# Patient Record
Sex: FEMALE | Race: OTHER | NOT HISPANIC OR LATINO
[De-identification: names, ages, dates, MRNs, and addresses within clinical notes are randomized per-mention and may not be internally consistent; named-entity substitution may affect disease eponyms.]

---

## 2020-03-05 ENCOUNTER — APPOINTMENT (OUTPATIENT)
Dept: FAMILY MEDICINE | Facility: CLINIC | Age: 30
End: 2020-03-05
Payer: COMMERCIAL

## 2020-03-05 VITALS
BODY MASS INDEX: 31.39 KG/M2 | SYSTOLIC BLOOD PRESSURE: 110 MMHG | OXYGEN SATURATION: 99 % | DIASTOLIC BLOOD PRESSURE: 80 MMHG | HEART RATE: 79 BPM | RESPIRATION RATE: 16 BRPM | WEIGHT: 200 LBS | HEIGHT: 67 IN

## 2020-03-05 DIAGNOSIS — Z87.09 PERSONAL HISTORY OF OTHER DISEASES OF THE RESPIRATORY SYSTEM: ICD-10-CM

## 2020-03-05 PROCEDURE — 96127 BRIEF EMOTIONAL/BEHAV ASSMT: CPT

## 2020-03-05 PROCEDURE — 99385 PREV VISIT NEW AGE 18-39: CPT | Mod: 25

## 2020-03-05 NOTE — PHYSICAL EXAM
[No Acute Distress] : no acute distress [Well-Appearing] : well-appearing [Normal Sclera/Conjunctiva] : normal sclera/conjunctiva [PERRL] : pupils equal round and reactive to light [Normal Outer Ear/Nose] : the outer ears and nose were normal in appearance [Normal Oropharynx] : the oropharynx was normal [No Lymphadenopathy] : no lymphadenopathy [Supple] : supple [No Respiratory Distress] : no respiratory distress  [No Accessory Muscle Use] : no accessory muscle use [Clear to Auscultation] : lungs were clear to auscultation bilaterally [Normal Rate] : normal rate  [Regular Rhythm] : with a regular rhythm [Normal S1, S2] : normal S1 and S2 [Soft] : abdomen soft [Non Tender] : non-tender [Non-distended] : non-distended [Normal Bowel Sounds] : normal bowel sounds [No Rash] : no rash [No Focal Deficits] : no focal deficits [Normal Affect] : the affect was normal [Normal Insight/Judgement] : insight and judgment were intact [de-identified] : + tonsillar exudate

## 2020-03-05 NOTE — REVIEW OF SYSTEMS
[Sore Throat] : sore throat [Fever] : no fever [Chills] : no chills [Fatigue] : no fatigue [Discharge] : no discharge [Vision Problems] : no vision problems [Earache] : no earache [Nasal Discharge] : no nasal discharge [Chest Pain] : no chest pain [Palpitations] : no palpitations [Shortness Of Breath] : no shortness of breath [Wheezing] : no wheezing [Cough] : no cough [Abdominal Pain] : no abdominal pain [Nausea] : no nausea [Diarrhea] : diarrhea [Vomiting] : no vomiting [Headache] : no headache [Dizziness] : no dizziness [Anxiety] : no anxiety [Depression] : no depression

## 2020-03-05 NOTE — PLAN
[FreeTextEntry1] : Obesity\par - healthy living referral to talk to nutritionist\par \par Constipation\par - referral to nutritionist to discuss high fiber diet\par - trial of IBguard  and probiotic\par - pt taking miralax and magnesium \par \par Sore throat\par - rapid strept neg\par - f/u throat cx\par - advised likely viral and to start salt water gargles, tea with honey\par \par Labs reviewed. HLD is mild and can be managed with diet and exercise \par \par f/u in 1 yr or PRN

## 2020-03-05 NOTE — HISTORY OF PRESENT ILLNESS
[FreeTextEntry1] : establish care, blood work review  [de-identified] : 29 year old female presents to establish care. She is currently here brien work visa from [place] and had labs done with company. Found to have slightly elevated cholesterol. She states she has gained weight since living in [place] for past 3 years. She has recently moved to [place]. Has just joined a gym. She complains of constipation and would like help with a high fiber diet \par She also complains of sore throat. No fever or cough.

## 2020-03-10 LAB — BACTERIA THROAT CULT: NORMAL

## 2020-03-30 ENCOUNTER — APPOINTMENT (OUTPATIENT)
Dept: CHRONIC DISEASE MANAGEMENT | Facility: CLINIC | Age: 30
End: 2020-03-30

## 2020-03-31 ENCOUNTER — TRANSCRIPTION ENCOUNTER (OUTPATIENT)
Age: 30
End: 2020-03-31

## 2020-05-04 ENCOUNTER — APPOINTMENT (OUTPATIENT)
Dept: CHRONIC DISEASE MANAGEMENT | Facility: CLINIC | Age: 30
End: 2020-05-04

## 2020-12-23 PROBLEM — Z87.09 HISTORY OF SORE THROAT: Status: RESOLVED | Noted: 2020-03-05 | Resolved: 2020-12-23

## 2021-02-19 ENCOUNTER — INPATIENT (INPATIENT)
Facility: HOSPITAL | Age: 31
LOS: 1 days | Discharge: HOME | DRG: 346 | End: 2021-02-21
Attending: SPECIALIST | Admitting: SPECIALIST
Payer: COMMERCIAL

## 2021-02-19 VITALS — WEIGHT: 182.98 LBS

## 2021-02-19 DIAGNOSIS — Z98.890 OTHER SPECIFIED POSTPROCEDURAL STATES: Chronic | ICD-10-CM

## 2021-02-19 DIAGNOSIS — L02.91 CUTANEOUS ABSCESS, UNSPECIFIED: ICD-10-CM

## 2021-02-19 DIAGNOSIS — K60.3 ANAL FISTULA: Chronic | ICD-10-CM

## 2021-02-19 LAB
ABO RH CONFIRMATION: SIGNIFICANT CHANGE UP
ALBUMIN SERPL ELPH-MCNC: 3.8 G/DL — SIGNIFICANT CHANGE UP (ref 3.3–5)
ALP SERPL-CCNC: 72 U/L — SIGNIFICANT CHANGE UP (ref 40–120)
ALT FLD-CCNC: 29 U/L — SIGNIFICANT CHANGE UP (ref 12–78)
ANION GAP SERPL CALC-SCNC: 9 MMOL/L — SIGNIFICANT CHANGE UP (ref 5–17)
APPEARANCE UR: CLEAR — SIGNIFICANT CHANGE UP
APTT BLD: 38.2 SEC — HIGH (ref 27.5–35.5)
AST SERPL-CCNC: 17 U/L — SIGNIFICANT CHANGE UP (ref 15–37)
BACTERIA # UR AUTO: ABNORMAL
BASOPHILS # BLD AUTO: 0.03 K/UL — SIGNIFICANT CHANGE UP (ref 0–0.2)
BASOPHILS NFR BLD AUTO: 0.2 % — SIGNIFICANT CHANGE UP (ref 0–2)
BILIRUB SERPL-MCNC: 0.3 MG/DL — SIGNIFICANT CHANGE UP (ref 0.2–1.2)
BILIRUB UR-MCNC: NEGATIVE — SIGNIFICANT CHANGE UP
BLD GP AB SCN SERPL QL: SIGNIFICANT CHANGE UP
BUN SERPL-MCNC: 6 MG/DL — LOW (ref 7–23)
CALCIUM SERPL-MCNC: 8.9 MG/DL — SIGNIFICANT CHANGE UP (ref 8.5–10.1)
CHLORIDE SERPL-SCNC: 106 MMOL/L — SIGNIFICANT CHANGE UP (ref 96–108)
CO2 SERPL-SCNC: 25 MMOL/L — SIGNIFICANT CHANGE UP (ref 22–31)
COLOR SPEC: YELLOW — SIGNIFICANT CHANGE UP
COMMENT - URINE: SIGNIFICANT CHANGE UP
CREAT SERPL-MCNC: 0.69 MG/DL — SIGNIFICANT CHANGE UP (ref 0.5–1.3)
DIFF PNL FLD: ABNORMAL
EOSINOPHIL # BLD AUTO: 0.02 K/UL — SIGNIFICANT CHANGE UP (ref 0–0.5)
EOSINOPHIL NFR BLD AUTO: 0.1 % — SIGNIFICANT CHANGE UP (ref 0–6)
EPI CELLS # UR: ABNORMAL
GLUCOSE SERPL-MCNC: 88 MG/DL — SIGNIFICANT CHANGE UP (ref 70–99)
GLUCOSE UR QL: NEGATIVE MG/DL — SIGNIFICANT CHANGE UP
HCT VFR BLD CALC: 38.1 % — SIGNIFICANT CHANGE UP (ref 34.5–45)
HGB BLD-MCNC: 12.7 G/DL — SIGNIFICANT CHANGE UP (ref 11.5–15.5)
IMM GRANULOCYTES NFR BLD AUTO: 0.3 % — SIGNIFICANT CHANGE UP (ref 0–1.5)
INR BLD: 1.16 RATIO — SIGNIFICANT CHANGE UP (ref 0.88–1.16)
KETONES UR-MCNC: NEGATIVE — SIGNIFICANT CHANGE UP
LACTATE SERPL-SCNC: 1.2 MMOL/L — SIGNIFICANT CHANGE UP (ref 0.7–2)
LEUKOCYTE ESTERASE UR-ACNC: ABNORMAL
LYMPHOCYTES # BLD AUTO: 1.97 K/UL — SIGNIFICANT CHANGE UP (ref 1–3.3)
LYMPHOCYTES # BLD AUTO: 14.2 % — SIGNIFICANT CHANGE UP (ref 13–44)
MCHC RBC-ENTMCNC: 28.4 PG — SIGNIFICANT CHANGE UP (ref 27–34)
MCHC RBC-ENTMCNC: 33.3 GM/DL — SIGNIFICANT CHANGE UP (ref 32–36)
MCV RBC AUTO: 85.2 FL — SIGNIFICANT CHANGE UP (ref 80–100)
MONOCYTES # BLD AUTO: 0.77 K/UL — SIGNIFICANT CHANGE UP (ref 0–0.9)
MONOCYTES NFR BLD AUTO: 5.5 % — SIGNIFICANT CHANGE UP (ref 2–14)
NEUTROPHILS # BLD AUTO: 11.09 K/UL — HIGH (ref 1.8–7.4)
NEUTROPHILS NFR BLD AUTO: 79.7 % — HIGH (ref 43–77)
NITRITE UR-MCNC: NEGATIVE — SIGNIFICANT CHANGE UP
PH UR: 6 — SIGNIFICANT CHANGE UP (ref 5–8)
PLATELET # BLD AUTO: 250 K/UL — SIGNIFICANT CHANGE UP (ref 150–400)
POTASSIUM SERPL-MCNC: 3.7 MMOL/L — SIGNIFICANT CHANGE UP (ref 3.5–5.3)
POTASSIUM SERPL-SCNC: 3.7 MMOL/L — SIGNIFICANT CHANGE UP (ref 3.5–5.3)
PROT SERPL-MCNC: 8.3 GM/DL — SIGNIFICANT CHANGE UP (ref 6–8.3)
PROT UR-MCNC: NEGATIVE MG/DL — SIGNIFICANT CHANGE UP
PROTHROM AB SERPL-ACNC: 13.5 SEC — SIGNIFICANT CHANGE UP (ref 10.6–13.6)
RBC # BLD: 4.47 M/UL — SIGNIFICANT CHANGE UP (ref 3.8–5.2)
RBC # FLD: 12.2 % — SIGNIFICANT CHANGE UP (ref 10.3–14.5)
RBC CASTS # UR COMP ASSIST: ABNORMAL /HPF (ref 0–4)
SARS-COV-2 RNA SPEC QL NAA+PROBE: SIGNIFICANT CHANGE UP
SODIUM SERPL-SCNC: 140 MMOL/L — SIGNIFICANT CHANGE UP (ref 135–145)
SP GR SPEC: 1 — LOW (ref 1.01–1.02)
UROBILINOGEN FLD QL: NEGATIVE MG/DL — SIGNIFICANT CHANGE UP
WBC # BLD: 13.92 K/UL — HIGH (ref 3.8–10.5)
WBC # FLD AUTO: 13.92 K/UL — HIGH (ref 3.8–10.5)
WBC UR QL: >50

## 2021-02-19 PROCEDURE — 83735 ASSAY OF MAGNESIUM: CPT

## 2021-02-19 PROCEDURE — 87075 CULTR BACTERIA EXCEPT BLOOD: CPT

## 2021-02-19 PROCEDURE — 87206 SMEAR FLUORESCENT/ACID STAI: CPT

## 2021-02-19 PROCEDURE — 85027 COMPLETE CBC AUTOMATED: CPT

## 2021-02-19 PROCEDURE — 87116 MYCOBACTERIA CULTURE: CPT

## 2021-02-19 PROCEDURE — C9290: CPT

## 2021-02-19 PROCEDURE — 87102 FUNGUS ISOLATION CULTURE: CPT

## 2021-02-19 PROCEDURE — 87070 CULTURE OTHR SPECIMN AEROBIC: CPT

## 2021-02-19 PROCEDURE — 36415 COLL VENOUS BLD VENIPUNCTURE: CPT

## 2021-02-19 PROCEDURE — 86769 SARS-COV-2 COVID-19 ANTIBODY: CPT

## 2021-02-19 PROCEDURE — 72193 CT PELVIS W/DYE: CPT | Mod: 26

## 2021-02-19 PROCEDURE — 87077 CULTURE AEROBIC IDENTIFY: CPT

## 2021-02-19 PROCEDURE — 84100 ASSAY OF PHOSPHORUS: CPT

## 2021-02-19 PROCEDURE — 80053 COMPREHEN METABOLIC PANEL: CPT

## 2021-02-19 PROCEDURE — 87015 SPECIMEN INFECT AGNT CONCNTJ: CPT

## 2021-02-19 RX ORDER — HYDROMORPHONE HYDROCHLORIDE 2 MG/ML
0.5 INJECTION INTRAMUSCULAR; INTRAVENOUS; SUBCUTANEOUS EVERY 4 HOURS
Refills: 0 | Status: DISCONTINUED | OUTPATIENT
Start: 2021-02-19 | End: 2021-02-20

## 2021-02-19 RX ORDER — SODIUM CHLORIDE 9 MG/ML
1000 INJECTION INTRAMUSCULAR; INTRAVENOUS; SUBCUTANEOUS ONCE
Refills: 0 | Status: COMPLETED | OUTPATIENT
Start: 2021-02-19 | End: 2021-02-19

## 2021-02-19 RX ORDER — ENOXAPARIN SODIUM 100 MG/ML
40 INJECTION SUBCUTANEOUS DAILY
Refills: 0 | Status: DISCONTINUED | OUTPATIENT
Start: 2021-02-19 | End: 2021-02-20

## 2021-02-19 RX ORDER — SODIUM CHLORIDE 9 MG/ML
1000 INJECTION, SOLUTION INTRAVENOUS
Refills: 0 | Status: DISCONTINUED | OUTPATIENT
Start: 2021-02-19 | End: 2021-02-20

## 2021-02-19 RX ORDER — METRONIDAZOLE 500 MG
500 TABLET ORAL EVERY 8 HOURS
Refills: 0 | Status: DISCONTINUED | OUTPATIENT
Start: 2021-02-19 | End: 2021-02-20

## 2021-02-19 RX ORDER — KETOROLAC TROMETHAMINE 30 MG/ML
15 SYRINGE (ML) INJECTION EVERY 6 HOURS
Refills: 0 | Status: DISCONTINUED | OUTPATIENT
Start: 2021-02-19 | End: 2021-02-20

## 2021-02-19 RX ORDER — ACETAMINOPHEN 500 MG
650 TABLET ORAL EVERY 6 HOURS
Refills: 0 | Status: DISCONTINUED | OUTPATIENT
Start: 2021-02-19 | End: 2021-02-20

## 2021-02-19 RX ORDER — MORPHINE SULFATE 50 MG/1
4 CAPSULE, EXTENDED RELEASE ORAL ONCE
Refills: 0 | Status: DISCONTINUED | OUTPATIENT
Start: 2021-02-19 | End: 2021-02-19

## 2021-02-19 RX ORDER — ONDANSETRON 8 MG/1
4 TABLET, FILM COATED ORAL EVERY 6 HOURS
Refills: 0 | Status: DISCONTINUED | OUTPATIENT
Start: 2021-02-19 | End: 2021-02-20

## 2021-02-19 RX ORDER — PIPERACILLIN AND TAZOBACTAM 4; .5 G/20ML; G/20ML
3.38 INJECTION, POWDER, LYOPHILIZED, FOR SOLUTION INTRAVENOUS ONCE
Refills: 0 | Status: COMPLETED | OUTPATIENT
Start: 2021-02-19 | End: 2021-02-19

## 2021-02-19 RX ORDER — CIPROFLOXACIN LACTATE 400MG/40ML
400 VIAL (ML) INTRAVENOUS EVERY 12 HOURS
Refills: 0 | Status: DISCONTINUED | OUTPATIENT
Start: 2021-02-19 | End: 2021-02-20

## 2021-02-19 RX ADMIN — MORPHINE SULFATE 4 MILLIGRAM(S): 50 CAPSULE, EXTENDED RELEASE ORAL at 17:43

## 2021-02-19 RX ADMIN — Medication 100 MILLIGRAM(S): at 22:37

## 2021-02-19 RX ADMIN — HYDROMORPHONE HYDROCHLORIDE 0.5 MILLIGRAM(S): 2 INJECTION INTRAMUSCULAR; INTRAVENOUS; SUBCUTANEOUS at 22:37

## 2021-02-19 RX ADMIN — PIPERACILLIN AND TAZOBACTAM 200 GRAM(S): 4; .5 INJECTION, POWDER, LYOPHILIZED, FOR SOLUTION INTRAVENOUS at 18:36

## 2021-02-19 RX ADMIN — MORPHINE SULFATE 4 MILLIGRAM(S): 50 CAPSULE, EXTENDED RELEASE ORAL at 20:39

## 2021-02-19 RX ADMIN — MORPHINE SULFATE 4 MILLIGRAM(S): 50 CAPSULE, EXTENDED RELEASE ORAL at 16:25

## 2021-02-19 RX ADMIN — SODIUM CHLORIDE 1000 MILLILITER(S): 9 INJECTION INTRAMUSCULAR; INTRAVENOUS; SUBCUTANEOUS at 18:12

## 2021-02-19 NOTE — ED STATDOCS - OBJECTIVE STATEMENT
31 y/o female with no pertinent PMHX presents to the ED c/o rectal pain x 5 days. Pt states she's had mild constipation but states pain to side of anus. Denies fevers, chills, drainage. Pt required drainage of gaetano-anal abscess in past, states pain feels similar in quality. No other complaints at this time.

## 2021-02-19 NOTE — H&P ADULT - NSHPPHYSICALEXAM_GEN_ALL_CORE
Vital Signs Last 24 Hrs  T(C): 36.8 (19 Feb 2021 18:29), Max: 37.1 (19 Feb 2021 15:07)  T(F): 98.3 (19 Feb 2021 18:29), Max: 98.8 (19 Feb 2021 15:07)  HR: 89 (19 Feb 2021 20:38) (88 - 119)  BP: 121/77 (19 Feb 2021 20:38) (113/67 - 123/86)  BP(mean): 96 (19 Feb 2021 15:07) (96 - 96)  RR: 18 (19 Feb 2021 20:38) (16 - 18)  SpO2: 100% (19 Feb 2021 20:38) (100% - 100%)    Physical Exam:  General: AAOx3, in NAD  HEENT: NC/AT, EOMI  Cardio: S1S2, RRR  Pulm: equal air entry b/l, non labored on RA  Abdomen: soft, NT/ND  Rectal: exquisite tenderness to palpation, could not tolerate TANESHA or full exam, whitish drainage noted per rectum

## 2021-02-19 NOTE — ED STATDOCS - PROGRESS NOTE DETAILS
31 y/o female with no pertinent PMHX presents to the ED c/o rectal pain x 5 days.  Reports h/o gaetano-rectal abscess x 2 in the last 2-3 years.  Last episode required drainage in [facility].  Pt reports she was told she had a Fistula, but did not f/u after Covid started last year.  Now having increasing R gaetano-rectal pain.  Also reports seeing pus after having BM today.  Severe pain with attempts to exam.  (+) Swelling to R gaetano-rectal area, ? extending to anal verge.  Unable to tolerate rectal exam by dr. Garcia.  WBC 13,900.  Pt required additional morphine.  CTr pending.   Consulting colorectal surgery.  Holly Peerz PA-C Discussed case with Colorectal and Resident.  Will get Covid testing.  Start Zosyn.  Repeat vitals.  Resident is going into OR now.  Will come after procedure to determine if pt needs to go to OR or can be drained in ED.  Right ischioanal abscess measuring 5.6 x 3.2 x 2cm.  Anal fistula suspected.  Holly Perez PA-C Discussed case with Colorectal and Resident.  Will get Covid testing.  Start Zosyn.  Repeat vitals.  Resident is going into OR now.  Will come after procedure to determine if pt needs to go to OR or can be drained in ED.  Right ischioanal abscess measuring 5.6 x 3.2 x 2cm.  Anal fistula suspected.  Probable OR drainage.  Holly Perez PA-C

## 2021-02-19 NOTE — H&P ADULT - ASSESSMENT
29 yo F with right ischioanal abscess, possible fistula.    Plan:  -admit under Dr. Jack  -CLD, NPO after midnight  -IVF  -IV abx  -rapid covid  -pain control prn  -plan for EUA, incision and drainage of abscess and all indicated procedures tomorrow    Plan discussed with Dr. Jack

## 2021-02-19 NOTE — ED STATDOCS - CLINICAL SUMMARY MEDICAL DECISION MAKING FREE TEXT BOX
Suspect gaetano-anal abscess. Will likely require drainage. Dispo pending further evaluation. Pt pw abscess. Pt c/o drainage of abscess. Will need CT pelvis to evaluate for more extant perianal/perirectal abscess.

## 2021-02-19 NOTE — H&P ADULT - ATTENDING COMMENTS
Pt seen and examined 2/20.    29 YO F presents with rectal pain.  No fevers.  Workup remarkable for CT scan demonstrating a R ischioanal abscess.  Pt has had prior L sided anorectal abscesses that were drained in the OR with history of fistula.   WBC 13k.  Pt admitted and started on Abx.    PMH, Meds, allergies SH, FH, ROS reviewed as above    Vital Signs Last 24 Hrs  T(C): 37.5 (20 Feb 2021 08:29), Max: 37.5 (20 Feb 2021 08:29)  T(F): 99.5 (20 Feb 2021 08:29), Max: 99.5 (20 Feb 2021 08:29)  HR: 83 (20 Feb 2021 08:29) (67 - 119)  BP: 103/56 (20 Feb 2021 08:29) (103/56 - 123/86)  BP(mean): 96 (19 Feb 2021 15:07) (96 - 96)  RR: 18 (20 Feb 2021 08:29) (16 - 18)  SpO2: 98% (20 Feb 2021 08:29) (98% - 100%)    NAD    imp: R sided ischorectal abscess  --recommend exam under anesthesia, drainage of abscess, possible seton insertion.  Risks includes bleeding, infection, fistula, pain, incontinence discussed.  Questions answered.  Pt elects to proceed. Pt seen and examined 2/20.    29 YO F presents with rectal pain.  No fevers.  Workup remarkable for CT scan demonstrating a R ischioanal abscess.  Pt has had prior L sided anorectal abscesses that were drained in the OR with history of fistula.   WBC 13k.  Pt admitted and started on Abx.    PMH, Meds, allergies SH, FH, ROS reviewed as above    Vital Signs Last 24 Hrs  T(C): 37.5 (20 Feb 2021 08:29), Max: 37.5 (20 Feb 2021 08:29)  T(F): 99.5 (20 Feb 2021 08:29), Max: 99.5 (20 Feb 2021 08:29)  HR: 83 (20 Feb 2021 08:29) (67 - 119)  BP: 103/56 (20 Feb 2021 08:29) (103/56 - 123/86)  BP(mean): 96 (19 Feb 2021 15:07) (96 - 96)  RR: 18 (20 Feb 2021 08:29) (16 - 18)  SpO2: 98% (20 Feb 2021 08:29) (98% - 100%)    NAD  Rectal: induration and erythema RAQ, moderately tender to touch, no drainage visible.  no sinuses on L side    labs/imaging reviewed    imp: R sided ischorectal abscess  --recommend exam under anesthesia, drainage of abscess, possible seton insertion.  Risks includes bleeding, infection, fistula, pain, incontinence discussed.  Questions answered.  Pt elects to proceed. Pt seen and examined 2/20.    31 YO F presents with rectal pain.  No fevers.  Workup remarkable for CT scan demonstrating a R ischioanal abscess.  Pt has had prior L sided anorectal abscesses that were drained in the OR.  She reports a history of a L sided fistula, but did not have an operation specifically for this.   WBC 13k.  Pt admitted and started on Abx.    PMH, Meds, allergies SH, FH, ROS reviewed as above    Vital Signs Last 24 Hrs  T(C): 37.5 (20 Feb 2021 08:29), Max: 37.5 (20 Feb 2021 08:29)  T(F): 99.5 (20 Feb 2021 08:29), Max: 99.5 (20 Feb 2021 08:29)  HR: 83 (20 Feb 2021 08:29) (67 - 119)  BP: 103/56 (20 Feb 2021 08:29) (103/56 - 123/86)  BP(mean): 96 (19 Feb 2021 15:07) (96 - 96)  RR: 18 (20 Feb 2021 08:29) (16 - 18)  SpO2: 98% (20 Feb 2021 08:29) (98% - 100%)    NAD  Rectal: induration and erythema RAQ, moderately tender to touch, no drainage visible.  no sinuses on L side    labs/imaging reviewed    imp: R sided ischorectal abscess  --recommend exam under anesthesia, drainage of abscess, possible seton insertion.  Risks includes bleeding, infection, fistula, pain, incontinence discussed.  Questions answered.  Pt elects to proceed.

## 2021-02-19 NOTE — H&P ADULT - NSHPLABSRESULTS_GEN_ALL_CORE
Labs:                        12.7   13.92 )-----------( 250      ( 19 Feb 2021 16:00 )             38.1   02-19    140  |  106  |  6<L>  ----------------------------<  88  3.7   |  25  |  0.69    Ca    8.9      19 Feb 2021 16:00    TPro  8.3  /  Alb  3.8  /  TBili  0.3  /  DBili  x   /  AST  17  /  ALT  29  /  AlkPhos  72  02-19    Imaging:  < from: CT Pelvis w/ IV Cont (02.19.21 @ 17:40) >    EXAM:  CT PELVIS ONLY IC                            PROCEDURE DATE:  02/19/2021      < end of copied text >    < from: CT Pelvis w/ IV Cont (02.19.21 @ 17:40) >    IMPRESSION:  *  Right ischioanal abscess measuring 5.6 x 3.2 x 2.0 cm (Craniocaudal, AP, transverse dimensions). Suggestion of skin opening along the right gluteal cleft with skin puckering and punctate gas. Although suspected, an associated anal fistula cannot be visualized with this imaging technique.    < end of copied text >

## 2021-02-19 NOTE — ED ADULT NURSE NOTE - NSIMPLEMENTINTERV_GEN_ALL_ED
Implemented All Universal Safety Interventions:  [place] to call system. Call bell, personal items and telephone within reach. Instruct patient to call for assistance. Room bathroom lighting operational. Non-slip footwear when patient is off stretcher. Physically safe environment: no spills, clutter or unnecessary equipment. Stretcher in lowest position, wheels locked, appropriate side rails in place.

## 2021-02-19 NOTE — ED STATDOCS - GASTROINTESTINAL, MLM
abdomen soft, non-tender, and non-distended. Bowel sounds present. abdomen soft, non-tender, and non-distended. Bowel sounds present. RECTAL: firm indurated area to 9' oclock region perianally, significant tenderness upon even external digital palpation

## 2021-02-19 NOTE — H&P ADULT - HISTORY OF PRESENT ILLNESS
Jose Enrique is a 31 yo F who presents to  ED with complaints of right-sided rectal pain since Monday. States she has trouble sitting and has pain with bowel movements. Pt was evaluated by her doctor and was prescribed topical lidocaine jelly, which she states did not help with the pain. Today, pt reports noting drainage, yellowish-white in color, per rectum so presented to the ED. Denies having fevers, chills, nausea or vomiting. Reports a history of a left-sided fistula that required OR intervention as well as 2 left-sided perirectal abscesses that were drained in the OR, last was 1 year ago at [place].

## 2021-02-20 ENCOUNTER — TRANSCRIPTION ENCOUNTER (OUTPATIENT)
Age: 31
End: 2021-02-20

## 2021-02-20 LAB
GRAM STN FLD: SIGNIFICANT CHANGE UP
SARS-COV-2 IGG SERPL QL IA: NEGATIVE — SIGNIFICANT CHANGE UP
SARS-COV-2 IGM SERPL IA-ACNC: 0.07 INDEX — SIGNIFICANT CHANGE UP
SPECIMEN SOURCE: SIGNIFICANT CHANGE UP

## 2021-02-20 PROCEDURE — 46040 I&D ISCHIORCT&/PERIRCT ABSC: CPT

## 2021-02-20 PROCEDURE — 99222 1ST HOSP IP/OBS MODERATE 55: CPT | Mod: 57,GC

## 2021-02-20 RX ORDER — ONDANSETRON 8 MG/1
4 TABLET, FILM COATED ORAL ONCE
Refills: 0 | Status: DISCONTINUED | OUTPATIENT
Start: 2021-02-20 | End: 2021-02-20

## 2021-02-20 RX ORDER — ENOXAPARIN SODIUM 100 MG/ML
40 INJECTION SUBCUTANEOUS DAILY
Refills: 0 | Status: DISCONTINUED | OUTPATIENT
Start: 2021-02-20 | End: 2021-02-21

## 2021-02-20 RX ORDER — ONDANSETRON 8 MG/1
4 TABLET, FILM COATED ORAL EVERY 6 HOURS
Refills: 0 | Status: DISCONTINUED | OUTPATIENT
Start: 2021-02-20 | End: 2021-02-21

## 2021-02-20 RX ORDER — SENNA PLUS 8.6 MG/1
2 TABLET ORAL AT BEDTIME
Refills: 0 | Status: DISCONTINUED | OUTPATIENT
Start: 2021-02-20 | End: 2021-02-21

## 2021-02-20 RX ORDER — HYDROMORPHONE HYDROCHLORIDE 2 MG/ML
0.5 INJECTION INTRAMUSCULAR; INTRAVENOUS; SUBCUTANEOUS
Refills: 0 | Status: DISCONTINUED | OUTPATIENT
Start: 2021-02-20 | End: 2021-02-20

## 2021-02-20 RX ORDER — ACETAMINOPHEN 500 MG
650 TABLET ORAL EVERY 6 HOURS
Refills: 0 | Status: DISCONTINUED | OUTPATIENT
Start: 2021-02-20 | End: 2021-02-21

## 2021-02-20 RX ORDER — INFLUENZA VIRUS VACCINE 15; 15; 15; 15 UG/.5ML; UG/.5ML; UG/.5ML; UG/.5ML
0.5 SUSPENSION INTRAMUSCULAR ONCE
Refills: 0 | Status: COMPLETED | OUTPATIENT
Start: 2021-02-20 | End: 2021-02-20

## 2021-02-20 RX ORDER — KETOROLAC TROMETHAMINE 30 MG/ML
15 SYRINGE (ML) INJECTION EVERY 6 HOURS
Refills: 0 | Status: DISCONTINUED | OUTPATIENT
Start: 2021-02-20 | End: 2021-02-21

## 2021-02-20 RX ORDER — OXYCODONE HYDROCHLORIDE 5 MG/1
5 TABLET ORAL ONCE
Refills: 0 | Status: DISCONTINUED | OUTPATIENT
Start: 2021-02-20 | End: 2021-02-20

## 2021-02-20 RX ORDER — METRONIDAZOLE 500 MG
500 TABLET ORAL EVERY 8 HOURS
Refills: 0 | Status: DISCONTINUED | OUTPATIENT
Start: 2021-02-20 | End: 2021-02-21

## 2021-02-20 RX ORDER — MEPERIDINE HYDROCHLORIDE 50 MG/ML
12.5 INJECTION INTRAMUSCULAR; INTRAVENOUS; SUBCUTANEOUS
Refills: 0 | Status: DISCONTINUED | OUTPATIENT
Start: 2021-02-20 | End: 2021-02-20

## 2021-02-20 RX ORDER — CIPROFLOXACIN LACTATE 400MG/40ML
400 VIAL (ML) INTRAVENOUS EVERY 12 HOURS
Refills: 0 | Status: DISCONTINUED | OUTPATIENT
Start: 2021-02-20 | End: 2021-02-21

## 2021-02-20 RX ORDER — SODIUM CHLORIDE 9 MG/ML
1000 INJECTION, SOLUTION INTRAVENOUS
Refills: 0 | Status: DISCONTINUED | OUTPATIENT
Start: 2021-02-20 | End: 2021-02-20

## 2021-02-20 RX ORDER — HYDROMORPHONE HYDROCHLORIDE 2 MG/ML
0.5 INJECTION INTRAMUSCULAR; INTRAVENOUS; SUBCUTANEOUS EVERY 4 HOURS
Refills: 0 | Status: DISCONTINUED | OUTPATIENT
Start: 2021-02-20 | End: 2021-02-21

## 2021-02-20 RX ADMIN — Medication 15 MILLIGRAM(S): at 01:11

## 2021-02-20 RX ADMIN — SENNA PLUS 2 TABLET(S): 8.6 TABLET ORAL at 22:29

## 2021-02-20 RX ADMIN — Medication 100 MILLIGRAM(S): at 13:54

## 2021-02-20 RX ADMIN — Medication 100 MILLIGRAM(S): at 07:12

## 2021-02-20 RX ADMIN — SODIUM CHLORIDE 100 MILLILITER(S): 9 INJECTION, SOLUTION INTRAVENOUS at 01:02

## 2021-02-20 RX ADMIN — Medication 200 MILLIGRAM(S): at 17:18

## 2021-02-20 RX ADMIN — ONDANSETRON 4 MILLIGRAM(S): 8 TABLET, FILM COATED ORAL at 01:02

## 2021-02-20 RX ADMIN — Medication 200 MILLIGRAM(S): at 06:03

## 2021-02-20 RX ADMIN — Medication 100 MILLIGRAM(S): at 22:26

## 2021-02-20 RX ADMIN — ONDANSETRON 4 MILLIGRAM(S): 8 TABLET, FILM COATED ORAL at 18:25

## 2021-02-20 RX ADMIN — OXYCODONE HYDROCHLORIDE 5 MILLIGRAM(S): 5 TABLET ORAL at 15:04

## 2021-02-20 RX ADMIN — HYDROMORPHONE HYDROCHLORIDE 0.5 MILLIGRAM(S): 2 INJECTION INTRAMUSCULAR; INTRAVENOUS; SUBCUTANEOUS at 07:20

## 2021-02-20 RX ADMIN — Medication 15 MILLIGRAM(S): at 10:33

## 2021-02-20 NOTE — BRIEF OPERATIVE NOTE - OPERATION/FINDINGS
20cc of pus aspirated from right perianal abscess  incision and drainage of abscess  packed with iodoform packing 20cc of pus aspirated from right perianal abscess, multiple healed sinuses RAQ and LLQ (suspicious for Crohn's disease)  No obvious fistula or internal opening.  No anal stenosis.  NO evidence of necrotising soft tissue infection  incision and drainage of abscess  packed with iodoform packing

## 2021-02-20 NOTE — DISCHARGE NOTE PROVIDER - CARE PROVIDER_API CALL
Kris Jack)  ColonRectal Surgery; [place]  [address] [address]  Phone: [phone]  Fax: [phone]  Follow Up Time: 1 week

## 2021-02-20 NOTE — DISCHARGE NOTE PROVIDER - HOSPITAL COURSE
Patient presented with perianal abscess. Patient underwent EUA with incision and drainage of perianal abscess. Tolerated procedure well. Safe for dc home.

## 2021-02-20 NOTE — BRIEF OPERATIVE NOTE - NSICDXBRIEFPROCEDURE_GEN_ALL_CORE_FT
PROCEDURES:  Incision and drainage, perianal abscess 20-Feb-2021 14:20:35  Tanya Pérez  Exam under anesthesia, anus 20-Feb-2021 14:20:15  Tanya Pérez

## 2021-02-20 NOTE — DISCHARGE NOTE PROVIDER - NSDCCPCAREPLAN_GEN_ALL_CORE_FT
PRINCIPAL DISCHARGE DIAGNOSIS  Diagnosis: Abscess  Assessment and Plan of Treatment:       SECONDARY DISCHARGE DIAGNOSES  Diagnosis: Rectal pain  Assessment and Plan of Treatment:

## 2021-02-20 NOTE — PROGRESS NOTE ADULT - ASSESSMENT
31 yo F with right ischioanal abscess, possible fistula.    Plan:  -NPO  -IVF  -IV abx  -pain control prn  -plan for EUA, incision and drainage of abscess and all indicated procedures today    Plan discussed with Dr. Jack

## 2021-02-20 NOTE — DISCHARGE NOTE PROVIDER - NSDCFUADDINST_GEN_ALL_CORE_FT
please use sitz baths as needed throughout the day. Take pain meds as needed. Continue with antibiotics as prescribed. Shower normally.

## 2021-02-20 NOTE — PATIENT PROFILE ADULT - DO YOU FEEL THREATENED BY OTHERS?
Dr. Pereira Flow notified of Intensivist consult and ABG results, repeat cxr and abg in am ordered no

## 2021-02-20 NOTE — PRE-OP CHECKLIST - SELECT TESTS ORDERED
Covid Negative 02/19/2021; Urine pregnancy- negative 02/19/2021/UCG/COVID Covid PCR- Negative 02/19/2021; Urine pregnancy- negative 02/19/2021/CBC/CMP/PT/PTT/INR/Type and Screen/Urinalysis/UCG/COVID

## 2021-02-20 NOTE — PROGRESS NOTE ADULT - SUBJECTIVE AND OBJECTIVE BOX
Pt seen and examined at bedside, no acute events. Pt complaining of pain in perirectal area. Denied fever, chills, nausea, vomiting or SOB overnight.               Vital Signs Last 24 Hrs  T(C): 37.3 (20 Feb 2021 01:18), Max: 37.3 (20 Feb 2021 01:18)  T(F): 99.2 (20 Feb 2021 01:18), Max: 99.2 (20 Feb 2021 01:18)  HR: 67 (20 Feb 2021 01:18) (67 - 119)  BP: 117/58 (20 Feb 2021 01:18) (113/67 - 123/86)  BP(mean): 96 (19 Feb 2021 15:07) (96 - 96)  RR: 18 (20 Feb 2021 01:18) (16 - 18)  SpO2: 99% (20 Feb 2021 01:18) (99% - 100%)    Labs:                                12.7   13.92 )-----------( 250      ( 19 Feb 2021 16:00 )             38.1     CBC Full  -  ( 19 Feb 2021 16:00 )  WBC Count : 13.92 K/uL  RBC Count : 4.47 M/uL  Hemoglobin : 12.7 g/dL  Hematocrit : 38.1 %  Platelet Count - Automated : 250 K/uL  Mean Cell Volume : 85.2 fl  Mean Cell Hemoglobin : 28.4 pg  Mean Cell Hemoglobin Concentration : 33.3 gm/dL  Auto Neutrophil # : 11.09 K/uL  Auto Lymphocyte # : 1.97 K/uL  Auto Monocyte # : 0.77 K/uL  Auto Eosinophil # : 0.02 K/uL  Auto Basophil # : 0.03 K/uL  Auto Neutrophil % : 79.7 %  Auto Lymphocyte % : 14.2 %  Auto Monocyte % : 5.5 %  Auto Eosinophil % : 0.1 %  Auto Basophil % : 0.2 %    02-19    140  |  106  |  6<L>  ----------------------------<  88  3.7   |  25  |  0.69    Ca    8.9      19 Feb 2021 16:00    TPro  8.3  /  Alb  3.8  /  TBili  0.3  /  DBili  x   /  AST  17  /  ALT  29  /  AlkPhos  72  02-19    LIVER FUNCTIONS - ( 19 Feb 2021 16:00 )  Alb: 3.8 g/dL / Pro: 8.3 gm/dL / ALK PHOS: 72 U/L / ALT: 29 U/L / AST: 17 U/L / GGT: x           PT/INR - ( 19 Feb 2021 16:00 )   PT: 13.5 sec;   INR: 1.16 ratio         PTT - ( 19 Feb 2021 16:00 )  PTT:38.2 sec      Meds:  acetaminophen   Tablet .. 650 milliGRAM(s) Oral every 6 hours PRN  ciprofloxacin   IVPB 400 milliGRAM(s) IV Intermittent every 12 hours  enoxaparin Injectable 40 milliGRAM(s) SubCutaneous daily  HYDROmorphone  Injectable 0.5 milliGRAM(s) IV Push every 4 hours PRN  influenza   Vaccine 0.5 milliLiter(s) IntraMuscular once  ketorolac   Injectable 15 milliGRAM(s) IV Push every 6 hours PRN  lactated ringers. 1000 milliLiter(s) IV Continuous <Continuous>  metroNIDAZOLE  IVPB 500 milliGRAM(s) IV Intermittent every 8 hours  ondansetron Injectable 4 milliGRAM(s) IV Push every 6 hours PRN      Radiology:    Physical Exam:  General: AAOx3, in NAD  HEENT: NC/AT, EOMI  Cardio: S1S2, RRR  Pulm: equal air entry b/l, non labored on RA  Abdomen: soft, NT/ND  Rectal: exquisite tenderness to palpation, could not tolerate TANESHA or full exam, whitish drainage

## 2021-02-20 NOTE — DISCHARGE NOTE PROVIDER - NSDCCPTREATMENT_GEN_ALL_CORE_FT
PRINCIPAL PROCEDURE  Procedure: Incision and drainage, perianal abscess  Findings and Treatment:

## 2021-02-21 ENCOUNTER — TRANSCRIPTION ENCOUNTER (OUTPATIENT)
Age: 31
End: 2021-02-21

## 2021-02-21 VITALS
SYSTOLIC BLOOD PRESSURE: 122 MMHG | OXYGEN SATURATION: 100 % | DIASTOLIC BLOOD PRESSURE: 72 MMHG | HEART RATE: 93 BPM | TEMPERATURE: 98 F | RESPIRATION RATE: 18 BRPM

## 2021-02-21 LAB
ALBUMIN SERPL ELPH-MCNC: 3 G/DL — LOW (ref 3.3–5)
ALP SERPL-CCNC: 59 U/L — SIGNIFICANT CHANGE UP (ref 40–120)
ALT FLD-CCNC: 23 U/L — SIGNIFICANT CHANGE UP (ref 12–78)
ANION GAP SERPL CALC-SCNC: 5 MMOL/L — SIGNIFICANT CHANGE UP (ref 5–17)
AST SERPL-CCNC: 13 U/L — LOW (ref 15–37)
BILIRUB SERPL-MCNC: 0.3 MG/DL — SIGNIFICANT CHANGE UP (ref 0.2–1.2)
BUN SERPL-MCNC: 10 MG/DL — SIGNIFICANT CHANGE UP (ref 7–23)
CALCIUM SERPL-MCNC: 8.6 MG/DL — SIGNIFICANT CHANGE UP (ref 8.5–10.1)
CHLORIDE SERPL-SCNC: 109 MMOL/L — HIGH (ref 96–108)
CO2 SERPL-SCNC: 26 MMOL/L — SIGNIFICANT CHANGE UP (ref 22–31)
CREAT SERPL-MCNC: 0.54 MG/DL — SIGNIFICANT CHANGE UP (ref 0.5–1.3)
GLUCOSE SERPL-MCNC: 101 MG/DL — HIGH (ref 70–99)
HCT VFR BLD CALC: 31.8 % — LOW (ref 34.5–45)
HGB BLD-MCNC: 10.6 G/DL — LOW (ref 11.5–15.5)
MAGNESIUM SERPL-MCNC: 2.2 MG/DL — SIGNIFICANT CHANGE UP (ref 1.6–2.6)
MCHC RBC-ENTMCNC: 28.3 PG — SIGNIFICANT CHANGE UP (ref 27–34)
MCHC RBC-ENTMCNC: 33.3 GM/DL — SIGNIFICANT CHANGE UP (ref 32–36)
MCV RBC AUTO: 85 FL — SIGNIFICANT CHANGE UP (ref 80–100)
PHOSPHATE SERPL-MCNC: 3.7 MG/DL — SIGNIFICANT CHANGE UP (ref 2.5–4.5)
PLATELET # BLD AUTO: 200 K/UL — SIGNIFICANT CHANGE UP (ref 150–400)
POTASSIUM SERPL-MCNC: 4.3 MMOL/L — SIGNIFICANT CHANGE UP (ref 3.5–5.3)
POTASSIUM SERPL-SCNC: 4.3 MMOL/L — SIGNIFICANT CHANGE UP (ref 3.5–5.3)
PROT SERPL-MCNC: 6.7 GM/DL — SIGNIFICANT CHANGE UP (ref 6–8.3)
RBC # BLD: 3.74 M/UL — LOW (ref 3.8–5.2)
RBC # FLD: 12.1 % — SIGNIFICANT CHANGE UP (ref 10.3–14.5)
SODIUM SERPL-SCNC: 140 MMOL/L — SIGNIFICANT CHANGE UP (ref 135–145)
WBC # BLD: 13.28 K/UL — HIGH (ref 3.8–10.5)
WBC # FLD AUTO: 13.28 K/UL — HIGH (ref 3.8–10.5)

## 2021-02-21 RX ORDER — IBUPROFEN 200 MG
1 TABLET ORAL
Qty: 28 | Refills: 0
Start: 2021-02-21 | End: 2021-02-27

## 2021-02-21 RX ORDER — METRONIDAZOLE 500 MG
1 TABLET ORAL
Qty: 30 | Refills: 0
Start: 2021-02-21 | End: 2021-03-02

## 2021-02-21 RX ORDER — METRONIDAZOLE 500 MG
1 TABLET ORAL
Qty: 42 | Refills: 0
Start: 2021-02-21 | End: 2021-03-06

## 2021-02-21 RX ORDER — MOXIFLOXACIN HYDROCHLORIDE TABLETS, 400 MG 400 MG/1
1 TABLET, FILM COATED ORAL
Qty: 20 | Refills: 0
Start: 2021-02-21 | End: 2021-03-02

## 2021-02-21 RX ORDER — MOXIFLOXACIN HYDROCHLORIDE TABLETS, 400 MG 400 MG/1
1 TABLET, FILM COATED ORAL
Qty: 14 | Refills: 0
Start: 2021-02-21 | End: 2021-02-27

## 2021-02-21 RX ORDER — DOCUSATE SODIUM 100 MG
1 CAPSULE ORAL
Qty: 21 | Refills: 0
Start: 2021-02-21 | End: 2021-02-27

## 2021-02-21 RX ORDER — ACETAMINOPHEN 500 MG
2 TABLET ORAL
Qty: 56 | Refills: 0
Start: 2021-02-21 | End: 2021-02-27

## 2021-02-21 RX ORDER — MOXIFLOXACIN HYDROCHLORIDE TABLETS, 400 MG 400 MG/1
1 TABLET, FILM COATED ORAL
Qty: 28 | Refills: 0
Start: 2021-02-21 | End: 2021-03-06

## 2021-02-21 RX ORDER — METRONIDAZOLE 500 MG
1 TABLET ORAL
Qty: 21 | Refills: 0
Start: 2021-02-21 | End: 2021-02-27

## 2021-02-21 RX ADMIN — Medication 15 MILLIGRAM(S): at 12:26

## 2021-02-21 RX ADMIN — Medication 100 MILLIGRAM(S): at 07:06

## 2021-02-21 RX ADMIN — Medication 200 MILLIGRAM(S): at 06:07

## 2021-02-21 RX ADMIN — Medication 15 MILLIGRAM(S): at 06:09

## 2021-02-21 NOTE — PROVIDER CONTACT NOTE (CRITICAL VALUE NOTIFICATION) - NS PROVIDER READ BACK
SUBJECTIVE:                                                    Angelito Espinoza is a 67 year old male who presents to clinic today for the following health issues:    Medication Followup of ALL    Taking Medication as prescribed: yes    Side Effects:  None    Medication Helping Symptoms:  yes     Problem list and histories reviewed & adjusted, as indicated.  Additional history: as documented    Patient Active Problem List   Diagnosis     Cellulitis     Chronic atrial fibrillation (H)     Lung nodules < 4mm bibasilar/ abdom CT  9-15     Morbid obesity due to excess calories (HCC) BMI 50-55     Long-term (current) use of anticoagulants [Z79.01]     ACP (advance care planning)     Personal history of tobacco use, presenting hazards to health: 11- 44y/o @      Stasis dermatitis of both legs     Edema, unspecified edema of bilat legs      Essential hypertension, benign     Hyperlipidemia LDL goal <130     Past Surgical History:   Procedure Laterality Date     CHOLECYSTECTOMY       ENT SURGERY       EYE SURGERY         Social History   Substance Use Topics     Smoking status: Former Smoker     Quit date: 12/8/1994     Smokeless tobacco: Never Used     Alcohol use No     Family History   Problem Relation Age of Onset     Unknown/Adopted Mother      Unknown/Adopted Father          Current Outpatient Prescriptions   Medication Sig Dispense Refill     warfarin (COUMADIN) 5 MG tablet Take 1.5 tablets (7.5 mg) by mouth daily Except 1 tablet on Tues as directed by the anticoagulation clinic, an appt needed with primary MD pror to refills 45 tablet 0     metoprolol (TOPROL XL) 200 MG 24 hr tablet Take 1 tablet (200 mg) by mouth 2 times daily Verified with patient this is the  tablet 0     lisinopril (PRINIVIL/ZESTRIL) 40 MG tablet Take 1 tablet (40 mg) by mouth daily 90 tablet 0     amLODIPine (NORVASC) 5 MG tablet Take 1 tablet (5 mg) by mouth daily 90 tablet 0     potassium chloride SA (POTASSIUM CHLORIDE) 20 MEQ CR  tablet Take 1 tablet (20 mEq) by mouth 2 times daily 180 tablet 0     furosemide (LASIX) 20 MG tablet Take 1 tablet (20 mg) by mouth 2 times daily 180 tablet 1     HYDROcodone-acetaminophen (NORCO) 5-325 MG per tablet Take 1-2 tablets by mouth every 4 hours as needed for moderate to severe pain 8 tablet 0     multivitamin, therapeutic with minerals (THERA-VIT-M) TABS Take 1 tablet by mouth daily       No Known Allergies  BP Readings from Last 3 Encounters:   04/04/17 114/72   11/16/16 124/80   05/17/16 128/84    Wt Readings from Last 3 Encounters:   04/04/17 (!) 400 lb 8 oz (181.7 kg)   11/16/16 (!) 405 lb 14.4 oz (184.1 kg)   05/17/16 (!) 380 lb (172.4 kg)           Labs reviewed in EPIC    Reviewed and updated as needed this visit by clinical staff  Tobacco  Allergies  Med Hx  Surg Hx  Fam Hx  Soc Hx      Reviewed and updated as needed this visit by Provider       ROS:  C: NEGATIVE for fever, chills, change in weight  E/M: NEGATIVE for ear, mouth and throat problems  R: NEGATIVE for significant cough or SOB  CV: NEGATIVE for chest pain, palpitations or peripheral edema  GI: NEGATIVE for nausea, abdominal pain, heartburn, or change in bowel habits  : NEGATIVE for frequency, dysuria, or hematuria  M: NEGATIVE for significant arthralgias or myalgia  H: NEGATIVE for bleeding problems  P: NEGATIVE for changes in mood or affect    OBJECTIVE:                                                    /72  Pulse 78  Temp 98.1  F (36.7  C) (Oral)  Ht 6' (1.829 m)  Wt (!) 400 lb 8 oz (181.7 kg)  SpO2 97%  BMI 54.32 kg/m2  Body mass index is 54.32 kg/(m^2).  GENERAL: healthy, alert and no distress  RESP: lungs clear to auscultation - no rales, no rhonchi, no wheezes  CV: irregular rates and rhythm, normal S1 S2, no click or rub   ABDOMEN: obese  MS: extremities- no gross deformities noted  PSYCH: Alert and oriented times 3; speech- coherent , normal rate and volume; able to articulate logical thoughts, able to  abstract reason, no tangential thoughts, no hallucinations or delusions, affect- normal       ASSESSMENT/PLAN:                                                    (I48.2) Chronic atrial fibrillation (H)  (primary encounter diagnosis)  Comment: rate controlled on therapy  Plan: lisinopril (PRINIVIL/ZESTRIL) 40 MG tablet,         metoprolol (TOPROL XL) 200 MG 24 hr tablet,         Comprehensive metabolic panel, warfarin         (COUMADIN) 5 MG tablet            (E66.01) Morbid obesity due to excess calories (HCC) BMI 50-55  Comment: discussed weight loss issues  Plan:     (I10) Essential hypertension, benign  Comment: stable on therapy  Plan: amLODIPine (NORVASC) 5 MG tablet, furosemide         (LASIX) 20 MG tablet, lisinopril         (PRINIVIL/ZESTRIL) 40 MG tablet, metoprolol         (TOPROL XL) 200 MG 24 hr tablet, potassium         chloride SA (POTASSIUM CHLORIDE) 20 MEQ CR         tablet, Comprehensive metabolic panel            (E78.5) Hyperlipidemia LDL goal <130  Comment: labs ordered fasting  Plan: Comprehensive metabolic panel, Lipid Profile            (Z12.5) Special screening for malignant neoplasm of prostate  Comment: ordered as screening  Plan: PSA, screen            (Z12.11) Colon cancer screening  Comment: ADVISED COLONOSCOPY FOR ROUTINE PREVENTATIVE CARE.  Plan: GASTROENTEROLOGY ADULT REF PROCEDURE ONLY            See Patient Instructions    Guevara Billy MD  [facility]    25 minutes spent with this patient, face to face, discussing treatment options for listed problems above as well as side effects of appropriate medications.  Counseling time extended beyond 50% of the clinic visit.  Medication dosing, treatment plan and follow-up were discussed. Also reviewed need for primary care testing for patient.      yes

## 2021-02-21 NOTE — PROGRESS NOTE PEDS - ASSESSMENT
29 yo F with right ischioanal abscess, possible fistula.    Plan:  -regular diet   -pain control  -IV abx  -Dispo: DC home today.     Plan discussed with Dr. Jack

## 2021-02-21 NOTE — DISCHARGE NOTE NURSING/CASE MANAGEMENT/SOCIAL WORK - PATIENT PORTAL LINK FT
You can access the FollowMyHealth Patient Portal offered by [facility] by registering at the following website: [email or web address]. By joining [facility]’s FollowMyHealth portal, you will also be able to view your health information using other applications (apps) compatible with our system.

## 2021-02-21 NOTE — PROVIDER CONTACT NOTE (CRITICAL VALUE NOTIFICATION) - TEST AND RESULT REPORTED:
Body fluid Numerous Polymorphonuclear Leukocytes, few gram negative rods and few gram positive cocci in pairs

## 2021-02-21 NOTE — PROGRESS NOTE PEDS - SUBJECTIVE AND OBJECTIVE BOX
Pt seen and examined at bedside, no acute events. Pt complaining of pain in perirectal area. Denied fever, chills, nausea, vomiting or SOB overnight.               Vital Signs Last 24 Hrs  T(C): 37.3 (20 Feb 2021 01:18), Max: 37.3 (20 Feb 2021 01:18)  T(F): 99.2 (20 Feb 2021 01:18), Max: 99.2 (20 Feb 2021 01:18)  HR: 67 (20 Feb 2021 01:18) (67 - 119)  BP: 117/58 (20 Feb 2021 01:18) (113/67 - 123/86)  BP(mean): 96 (19 Feb 2021 15:07) (96 - 96)  RR: 18 (20 Feb 2021 01:18) (16 - 18)  SpO2: 99% (20 Feb 2021 01:18) (99% - 100%)    Labs:                                12.7   13.92 )-----------( 250      ( 19 Feb 2021 16:00 )             38.1     CBC Full  -  ( 19 Feb 2021 16:00 )  WBC Count : 13.92 K/uL  RBC Count : 4.47 M/uL  Hemoglobin : 12.7 g/dL  Hematocrit : 38.1 %  Platelet Count - Automated : 250 K/uL  Mean Cell Volume : 85.2 fl  Mean Cell Hemoglobin : 28.4 pg  Mean Cell Hemoglobin Concentration : 33.3 gm/dL  Auto Neutrophil # : 11.09 K/uL  Auto Lymphocyte # : 1.97 K/uL  Auto Monocyte # : 0.77 K/uL  Auto Eosinophil # : 0.02 K/uL  Auto Basophil # : 0.03 K/uL  Auto Neutrophil % : 79.7 %  Auto Lymphocyte % : 14.2 %  Auto Monocyte % : 5.5 %  Auto Eosinophil % : 0.1 %  Auto Basophil % : 0.2 %    02-19    140  |  106  |  6<L>  ----------------------------<  88  3.7   |  25  |  0.69    Ca    8.9      19 Feb 2021 16:00    TPro  8.3  /  Alb  3.8  /  TBili  0.3  /  DBili  x   /  AST  17  /  ALT  29  /  AlkPhos  72  02-19    LIVER FUNCTIONS - ( 19 Feb 2021 16:00 )  Alb: 3.8 g/dL / Pro: 8.3 gm/dL / ALK PHOS: 72 U/L / ALT: 29 U/L / AST: 17 U/L / GGT: x           PT/INR - ( 19 Feb 2021 16:00 )   PT: 13.5 sec;   INR: 1.16 ratio         PTT - ( 19 Feb 2021 16:00 )  PTT:38.2 sec      Meds:  acetaminophen   Tablet .. 650 milliGRAM(s) Oral every 6 hours PRN  ciprofloxacin   IVPB 400 milliGRAM(s) IV Intermittent every 12 hours  enoxaparin Injectable 40 milliGRAM(s) SubCutaneous daily  HYDROmorphone  Injectable 0.5 milliGRAM(s) IV Push every 4 hours PRN  influenza   Vaccine 0.5 milliLiter(s) IntraMuscular once  ketorolac   Injectable 15 milliGRAM(s) IV Push every 6 hours PRN  lactated ringers. 1000 milliLiter(s) IV Continuous <Continuous>  metroNIDAZOLE  IVPB 500 milliGRAM(s) IV Intermittent every 8 hours  ondansetron Injectable 4 milliGRAM(s) IV Push every 6 hours PRN      Radiology:    Physical Exam:  General: AAOx3, in NAD  HEENT: NC/AT, EOMI  Cardio: S1S2, RRR  Pulm: equal air entry b/l, non labored on RA  Abdomen: soft, NT/ND  Rectal: dressing in place

## 2021-02-21 NOTE — CDI QUERY NOTE - NSCDIOTHERTXTBX_GEN_ALL_CORE_HH
Documentation of Incision and drainage of perianal abscess.    Please clarify the depth of the Incision and drainage  a) skin alone   b) Incision and drainage including Skin and subcutaneous tissue   c)  Incision and drainage including Skin, subcutaneous tissue and fascia   d) Incision and drainage including Skin, subcutaneous tissue , fascia and muscle   e) other , please clarify.

## 2021-02-22 ENCOUNTER — NON-APPOINTMENT (OUTPATIENT)
Age: 31
End: 2021-02-22

## 2021-02-22 PROBLEM — Z78.9 OTHER SPECIFIED HEALTH STATUS: Chronic | Status: ACTIVE | Noted: 2021-02-19

## 2021-02-22 LAB
CULTURE RESULTS: SIGNIFICANT CHANGE UP
NIGHT BLUE STAIN TISS: SIGNIFICANT CHANGE UP
SPECIMEN SOURCE: SIGNIFICANT CHANGE UP
SPECIMEN SOURCE: SIGNIFICANT CHANGE UP

## 2021-02-26 ENCOUNTER — APPOINTMENT (OUTPATIENT)
Dept: COLORECTAL SURGERY | Facility: CLINIC | Age: 31
End: 2021-02-26
Payer: COMMERCIAL

## 2021-02-26 VITALS
DIASTOLIC BLOOD PRESSURE: 85 MMHG | HEART RATE: 88 BPM | TEMPERATURE: 97.2 F | SYSTOLIC BLOOD PRESSURE: 146 MMHG | WEIGHT: 182 LBS | BODY MASS INDEX: 28.56 KG/M2 | RESPIRATION RATE: 14 BRPM | HEIGHT: 67 IN

## 2021-02-26 PROCEDURE — 99024 POSTOP FOLLOW-UP VISIT: CPT

## 2021-03-02 DIAGNOSIS — K61.2 ANORECTAL ABSCESS: ICD-10-CM

## 2021-03-04 NOTE — ASSESSMENT
[FreeTextEntry1] : Perianal abscesses\par --s/p I&D of RAQ anorectal abscess.  Wound appears clean\par --continue sitz baths.  Follow up in 4 weeks.\par --given prior history of multiple anorectal abscesses on both R & L side, I am concerned about the presentation of anorectal Crohn's.  Will discuss further workup at next visit\par --if pain worsens, or if patient has fevers, trouble urinating, call office for urgent re-evaluation\par --I will also scan in MRI pelvis from 1/20/2020\par

## 2021-03-04 NOTE — HISTORY OF PRESENT ILLNESS
[FreeTextEntry1] : s/p I&D of R sided perianal abscess about a week ago.\par \par Suspicion for Crohn's given multiple sinuses and on both R and L side.  Operative findings this past weekend showed persistent L sided sinus in adidtion to the RAQ abscess.  She has had multiple anorectal abscesses in the past (however, these were all on the L side).  She underwent MRI of the pelvis in January 2020.  Since d/c from hospital pt reports mild pain with BM.  No constipation.  Trace bleeding from wound.  Still with drainage.  No fevers, no trouble urinating.\par \par Intraoperative cultures showed few Group B strep and rare bacteroides ovatus (susceptibilities not performed)\par \par Has never had a colonoscopy before.  No family history of colon cancer or IBD

## 2021-03-04 NOTE — PHYSICAL EXAM
[de-identified] : No perianal erythema.  RAQ wound with good granulation tissue, clean wound base.  no pus expressible.  Multiple sinuses LLQ->LAQ

## 2021-03-24 LAB
CULTURE RESULTS: SIGNIFICANT CHANGE UP
SPECIMEN SOURCE: SIGNIFICANT CHANGE UP

## 2021-03-26 ENCOUNTER — TRANSCRIPTION ENCOUNTER (OUTPATIENT)
Age: 31
End: 2021-03-26

## 2021-04-14 LAB
CULTURE RESULTS: SIGNIFICANT CHANGE UP
SPECIMEN SOURCE: SIGNIFICANT CHANGE UP

## 2021-04-19 ENCOUNTER — NON-APPOINTMENT (OUTPATIENT)
Age: 31
End: 2021-04-19

## 2021-04-19 ENCOUNTER — APPOINTMENT (OUTPATIENT)
Dept: COLORECTAL SURGERY | Facility: CLINIC | Age: 31
End: 2021-04-19
Payer: COMMERCIAL

## 2021-04-19 VITALS
TEMPERATURE: 97.2 F | DIASTOLIC BLOOD PRESSURE: 82 MMHG | WEIGHT: 180 LBS | HEART RATE: 82 BPM | RESPIRATION RATE: 14 BRPM | HEIGHT: 67 IN | SYSTOLIC BLOOD PRESSURE: 115 MMHG | BODY MASS INDEX: 28.25 KG/M2

## 2021-04-19 PROCEDURE — 99024 POSTOP FOLLOW-UP VISIT: CPT

## 2021-05-05 ENCOUNTER — APPOINTMENT (OUTPATIENT)
Dept: COLORECTAL SURGERY | Facility: CLINIC | Age: 31
End: 2021-05-05

## 2021-05-13 ENCOUNTER — NON-APPOINTMENT (OUTPATIENT)
Age: 31
End: 2021-05-13

## 2021-05-13 NOTE — ASSESSMENT
[FreeTextEntry1] : Perianal abscesses\par --s/p I&D of RAQ anorectal abscess.  Wound appears healed.\par --given prior history of multiple anorectal abscesses on both R & L side, I am concerned about the presentation of anorectal Crohn's.  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 Per the operative report, it was drained by making a R sided and L sided incisions.  She subsequently developed a fistula.  MRI confirmed L transsphincteric anal fistula in early 2020 (?Jan).  She subsequently was diagnosed with a Right lateral anal fissure.  Was treated with nifedipine for this but pt remained symptomatic after 5 week course.  Most recent exam by [place] physicians showed a sinus in the LLQ.  It does not appear that patient underwent surgical treatment of the fistula afterwards.\par --given these findings, I would like to repeat the MRI

## 2021-05-13 NOTE — HISTORY OF PRESENT ILLNESS
Dr. Benítez reviewed your labs.  Continue routine labs no changes needed.  Letter sent for next lab appointment 03/06/17     [FreeTextEntry1] : since last visit, the wound appears to have healed.  No bleeding, no drainage.  Still has pain and discomfort after BM.  The pain lasts for a couple of hours.  It is a level 3 out of 10.  No diarrhea.

## 2021-05-13 NOTE — PHYSICAL EXAM
[de-identified] : no perianal erythema or induration.  LLQ external sinus ~ 2cm outside of anal verge.   Probing goes in about 1cm.  RLQ scarring with tag.  no residual sinus.  palpable cord to anus. TANESHA attempted, tender to palpation.

## 2021-05-24 ENCOUNTER — TRANSCRIPTION ENCOUNTER (OUTPATIENT)
Age: 31
End: 2021-05-24

## 2021-06-11 ENCOUNTER — APPOINTMENT (OUTPATIENT)
Dept: FAMILY MEDICINE | Facility: CLINIC | Age: 31
End: 2021-06-11
Payer: COMMERCIAL

## 2021-06-11 VITALS
DIASTOLIC BLOOD PRESSURE: 80 MMHG | HEART RATE: 95 BPM | OXYGEN SATURATION: 98 % | SYSTOLIC BLOOD PRESSURE: 120 MMHG | BODY MASS INDEX: 29.82 KG/M2 | TEMPERATURE: 97.8 F | WEIGHT: 190 LBS | HEIGHT: 67 IN

## 2021-06-11 DIAGNOSIS — Z13.31 ENCOUNTER FOR SCREENING FOR DEPRESSION: ICD-10-CM

## 2021-06-11 DIAGNOSIS — N92.6 IRREGULAR MENSTRUATION, UNSPECIFIED: ICD-10-CM

## 2021-06-11 DIAGNOSIS — R10.13 EPIGASTRIC PAIN: ICD-10-CM

## 2021-06-11 PROCEDURE — 96127 BRIEF EMOTIONAL/BEHAV ASSMT: CPT

## 2021-06-11 PROCEDURE — 99214 OFFICE O/P EST MOD 30 MIN: CPT | Mod: 25

## 2021-06-11 PROCEDURE — 99072 ADDL SUPL MATRL&STAF TM PHE: CPT

## 2021-06-11 NOTE — PLAN
[FreeTextEntry1] : Epigastric pain\par - likely GERD, however f/u US to r/o GB etiology\par - avoid acidic foods, discussed lifestyle modifcations \par - omeprazole once a day\par \par Irregular menses\par - f/u labs\par - possibly normal for pt. Briefly discussed management with birth control but will f/u with GYN for further recs and workup \par \par Overweight\par - advised I would like to focus on her current complaints and that she should make an appt to discuss weight management. She is agreeable to this\par

## 2021-06-11 NOTE — PHYSICAL EXAM
[No Acute Distress] : no acute distress [Well-Appearing] : well-appearing [Normal Sclera/Conjunctiva] : normal sclera/conjunctiva [PERRL] : pupils equal round and reactive to light [Normal Outer Ear/Nose] : the outer ears and nose were normal in appearance [Normal TMs] : both tympanic membranes were normal [No Respiratory Distress] : no respiratory distress  [No Accessory Muscle Use] : no accessory muscle use [Clear to Auscultation] : lungs were clear to auscultation bilaterally [Normal Rate] : normal rate  [Regular Rhythm] : with a regular rhythm [Soft] : abdomen soft [Non Tender] : non-tender [Non-distended] : non-distended [Normal Bowel Sounds] : normal bowel sounds [No Focal Deficits] : no focal deficits [Normal Affect] : the affect was normal [Normal Insight/Judgement] : insight and judgment were intact

## 2021-06-11 NOTE — HISTORY OF PRESENT ILLNESS
[FreeTextEntry8] : 30 year old female presents with 1 week history of abdominal pain. States it is in the upper middle of her abdomen and started after eating different foods. She denies N/V/D/C, no bloody BM. No fever and otherwise feels way. States the pain is like pressure and comes and goes, worse after eating. \par She complains today of irregular menses and states her period is often every 40-50 days. When she gets her period it is normal, not heavy. Has not seen GYN\par She also has concerns about losing weight and would like to discuss

## 2021-06-12 LAB
25(OH)D3 SERPL-MCNC: 26.3 NG/ML
ALBUMIN SERPL ELPH-MCNC: 4.7 G/DL
ALP BLD-CCNC: 70 U/L
ALT SERPL-CCNC: 21 U/L
AMYLASE/CREAT SERPL: 91 U/L
ANION GAP SERPL CALC-SCNC: 12 MMOL/L
AST SERPL-CCNC: 17 U/L
BASOPHILS # BLD AUTO: 0.03 K/UL
BASOPHILS NFR BLD AUTO: 0.3 %
BILIRUB SERPL-MCNC: 0.3 MG/DL
BUN SERPL-MCNC: 12 MG/DL
CALCIUM SERPL-MCNC: 9.7 MG/DL
CHLORIDE SERPL-SCNC: 103 MMOL/L
CHOLEST SERPL-MCNC: 180 MG/DL
CO2 SERPL-SCNC: 24 MMOL/L
CREAT SERPL-MCNC: 0.71 MG/DL
EOSINOPHIL # BLD AUTO: 0.05 K/UL
EOSINOPHIL NFR BLD AUTO: 0.5 %
ESTIMATED AVERAGE GLUCOSE: 103 MG/DL
FOLATE SERPL-MCNC: 14 NG/ML
GGT SERPL-CCNC: 14 U/L
GLUCOSE SERPL-MCNC: 80 MG/DL
HAV IGM SER QL: NONREACTIVE
HBA1C MFR BLD HPLC: 5.2 %
HBV CORE IGM SER QL: NONREACTIVE
HBV SURFACE AG SER QL: NONREACTIVE
HCT VFR BLD CALC: 42.5 %
HCV AB SER QL: NONREACTIVE
HCV S/CO RATIO: 0.13 S/CO
HDLC SERPL-MCNC: 56 MG/DL
HGB BLD-MCNC: 13.5 G/DL
HIV1+2 AB SPEC QL IA.RAPID: NONREACTIVE
IMM GRANULOCYTES NFR BLD AUTO: 0.2 %
LDLC SERPL CALC-MCNC: 101 MG/DL
LPL SERPL-CCNC: 53 U/L
LYMPHOCYTES # BLD AUTO: 1.99 K/UL
LYMPHOCYTES NFR BLD AUTO: 21 %
MAN DIFF?: NORMAL
MCHC RBC-ENTMCNC: 28 PG
MCHC RBC-ENTMCNC: 31.8 GM/DL
MCV RBC AUTO: 88.2 FL
MONOCYTES # BLD AUTO: 0.62 K/UL
MONOCYTES NFR BLD AUTO: 6.5 %
NEUTROPHILS # BLD AUTO: 6.76 K/UL
NEUTROPHILS NFR BLD AUTO: 71.5 %
NONHDLC SERPL-MCNC: 124 MG/DL
PLATELET # BLD AUTO: 269 K/UL
POTASSIUM SERPL-SCNC: 4.2 MMOL/L
PROT SERPL-MCNC: 7.6 G/DL
RBC # BLD: 4.82 M/UL
RBC # FLD: 12.9 %
SODIUM SERPL-SCNC: 140 MMOL/L
T PALLIDUM AB SER QL IA: NEGATIVE
TRIGL SERPL-MCNC: 117 MG/DL
TSH SERPL-ACNC: 1.25 UIU/ML
VIT B12 SERPL-MCNC: 566 PG/ML
WBC # FLD AUTO: 9.47 K/UL

## 2021-06-14 ENCOUNTER — OUTPATIENT (OUTPATIENT)
Dept: OUTPATIENT SERVICES | Facility: HOSPITAL | Age: 31
LOS: 1 days | End: 2021-06-14
Payer: COMMERCIAL

## 2021-06-14 ENCOUNTER — APPOINTMENT (OUTPATIENT)
Dept: ULTRASOUND IMAGING | Facility: CLINIC | Age: 31
End: 2021-06-14
Payer: COMMERCIAL

## 2021-06-14 DIAGNOSIS — R10.13 EPIGASTRIC PAIN: ICD-10-CM

## 2021-06-14 DIAGNOSIS — Z98.890 OTHER SPECIFIED POSTPROCEDURAL STATES: Chronic | ICD-10-CM

## 2021-06-14 DIAGNOSIS — K60.3 ANAL FISTULA: Chronic | ICD-10-CM

## 2021-06-14 LAB
C TRACH RRNA SPEC QL NAA+PROBE: NOT DETECTED
N GONORRHOEA RRNA SPEC QL NAA+PROBE: NOT DETECTED
SOURCE AMPLIFICATION: NORMAL

## 2021-06-14 PROCEDURE — 76700 US EXAM ABDOM COMPLETE: CPT

## 2021-06-14 PROCEDURE — 76700 US EXAM ABDOM COMPLETE: CPT | Mod: 26

## 2021-06-30 ENCOUNTER — NON-APPOINTMENT (OUTPATIENT)
Age: 31
End: 2021-06-30

## 2021-07-12 ENCOUNTER — APPOINTMENT (OUTPATIENT)
Dept: FAMILY MEDICINE | Facility: CLINIC | Age: 31
End: 2021-07-12
Payer: COMMERCIAL

## 2021-07-12 VITALS
HEIGHT: 67 IN | DIASTOLIC BLOOD PRESSURE: 82 MMHG | OXYGEN SATURATION: 98 % | HEART RATE: 78 BPM | TEMPERATURE: 97.3 F | BODY MASS INDEX: 29.82 KG/M2 | WEIGHT: 190 LBS | SYSTOLIC BLOOD PRESSURE: 120 MMHG

## 2021-07-12 DIAGNOSIS — K59.00 CONSTIPATION, UNSPECIFIED: ICD-10-CM

## 2021-07-12 PROCEDURE — 99072 ADDL SUPL MATRL&STAF TM PHE: CPT

## 2021-07-12 PROCEDURE — 99214 OFFICE O/P EST MOD 30 MIN: CPT

## 2021-07-12 NOTE — PLAN
[FreeTextEntry1] : Liver mass\par - discussed potential ddx with patient. Patient states she had phobia of MRI and used an open machine for her imaging. \par - likely FNH however recommend immediate hepatology referral for further recs\par \par Anal fistula\par - should cont f/u with colorectal\par - advised cream will not solve this problem and she will likely need surgery\par - increase insoluble fiber intake \par \par Overweight\par - briefly discussed healthy foods\par - healthy living referral for more dedicated meal plan as we did not have time to discuss due to length of visit already and time constraints \par \par Constipation\par - cont magnesium PRN\par - insoluble fiber, discussed foods to eat

## 2021-07-12 NOTE — HISTORY OF PRESENT ILLNESS
[FreeTextEntry1] : f/u review MRI [de-identified] : 31 year old female presents for multiple concerns.\par She would like to review her MRI of the liver results for liver lesion, ddx includes FNH, adenoma or myofibroblastic tumor, although infection and metastasis cannot be excluded according to radiology read . Patient is asymptomatic today\par She also wants to discuss her weight and how to lose weight. She is asking for specific meal plans and what she should eat to lose weight. \par She is also inquiring about her fistulas. Is waiting to hear back from colorectal surgeon. Was told she needed surgery. Is looking for a cream. She does complain of constipation, taking magnesium PRN. Is trying to eat more fiber.

## 2021-07-16 ENCOUNTER — APPOINTMENT (OUTPATIENT)
Dept: COLORECTAL SURGERY | Facility: CLINIC | Age: 31
End: 2021-07-16
Payer: COMMERCIAL

## 2021-07-16 VITALS
WEIGHT: 190 LBS | HEIGHT: 67 IN | RESPIRATION RATE: 14 BRPM | DIASTOLIC BLOOD PRESSURE: 81 MMHG | BODY MASS INDEX: 29.82 KG/M2 | HEART RATE: 79 BPM | TEMPERATURE: 97.4 F | SYSTOLIC BLOOD PRESSURE: 119 MMHG

## 2021-07-16 PROCEDURE — 99072 ADDL SUPL MATRL&STAF TM PHE: CPT

## 2021-07-16 PROCEDURE — 99214 OFFICE O/P EST MOD 30 MIN: CPT

## 2021-07-20 ENCOUNTER — NON-APPOINTMENT (OUTPATIENT)
Age: 31
End: 2021-07-20

## 2021-07-21 ENCOUNTER — APPOINTMENT (OUTPATIENT)
Dept: COLORECTAL SURGERY | Facility: CLINIC | Age: 31
End: 2021-07-21
Payer: COMMERCIAL

## 2021-07-21 VITALS — BODY MASS INDEX: 29.82 KG/M2 | WEIGHT: 190 LBS | TEMPERATURE: 97.6 F | RESPIRATION RATE: 14 BRPM | HEIGHT: 67 IN

## 2021-07-21 PROCEDURE — 99072 ADDL SUPL MATRL&STAF TM PHE: CPT

## 2021-07-21 PROCEDURE — 99215 OFFICE O/P EST HI 40 MIN: CPT

## 2021-07-21 NOTE — ASSESSMENT
[FreeTextEntry1] : Anal fistula\par --from MRI and physical exam, there appears to be a posterior midline internal opening that could be the source of fistula.  The previously seen fistula cavities have improved from original MRI from 2 years ago.   The current MRI does not suggest presence of acute abscess.\par --Given complexity of fistula, would recommend 2 stage operation.  Recommend exam under anesthesia, possible fistulotomy, possible abscess drainage, possible seton insertion.  Risks including but not limited to bleeding, infection, fistula recurrence, pain, urinary retention, incontinence discussed.  Quesitons answered.  Pt wishes to think about it and get a second opinion.  I will put in request for surgery and patient will let us know when she wishes to proceed.\par --After healing from the above first procedure, then plan on definitive fistula repair which includes mucosal advancement flap, ligation of fistula tract or fistulotomy with seton removal.\par --If pain worsens significantly, or if she experiences fevers, trouble urinating, she should contact office for urgent re-evaluation

## 2021-07-21 NOTE — HISTORY OF PRESENT ILLNESS
[FreeTextEntry1] : Pt reports that persistent R sided perianal pain and swelling.  no fevers. \par \par BM's are generally normal, occasional constipation.  Still is painful.  Reports occasional drainage from the R sided external opening.\par \par MRI report shows Complex posterior-anterior horseshoe fistula with significant interval improvement/healing. with mostly resolved anterior horseshoe fistula with tiny linear left sided residual and small residual posterior fistula tract extending from posterior midline interphincteric space to along left posterior external anal sphincter towards perianalorectal skin wound. Based on prior study, mostly scarred down anal entry point at the posterior wall midline 6 o'clock position of mid anus and another at the left posterior wall distal, about the level of anal verge. no abscess collection.\par \par R sided fistula: New or recurrent edema/enhancement of the right sided puborectalis sling over the right lateral to posterior anus appears extending from the above posterior midline intersphincteric origin and the rest of mostly scarred-down right subcutaneous fistula tract divides distally prior to reaching right medial skin wounds. No abscess collection. \par \par Review of the MRI images shows R sided chronic abscess cavity, probable posterior midline internal opening.

## 2021-07-21 NOTE — PHYSICAL EXAM
[de-identified] : no perianal erythema or induration.  LLQ opening near anal verge.  No pus expressible. R side somewhat tender, no induration appreciated. TANESHA shows some posterior midline scarring.

## 2021-07-25 NOTE — HISTORY OF PRESENT ILLNESS
[FreeTextEntry1] : 31 year old female who presents for a follow up visit. She has a history of recurrent perianal abscesses and known fistula and surgery was recommended by Dr. Kris Jack to address the fistulas. Recommendation was for a 2 stage procedure and she is here for a second opinion. She was treated by Dr. Mejia in [place] and underwent drainage of an anterior horseshoe perianal abscess in November 2019 and a left and right incision was made to drain this abscess through previous scars as she had had perianal abscesses drained in the past. She developed a left sided fistula (no surgery performed for fistula) and more recently required incision and drainage of a right sided perianal abscess by Dr. Jack in February 2021. Her recent MRI showed complex bilateral fistulas. No prior colonoscopy or family history of IBD. She has no abdominal symptoms. She complains of painful bowel movements and is treating anal fissure.

## 2021-07-25 NOTE — PHYSICAL EXAM
[Respiratory Effort] : normal respiratory effort [Calm] : calm [de-identified] : open sinus in the left perianal region consistent with left fistula. No erythema or fluctuance in this area. Right sided perianal scar with tenderness on plapation but no obvious induration or fluctuance either. Palpable cord from the right perianal scar towards anal verge - likely fistula tract. Superficial anterior and posterior anal fissure noted. TANESHA and anoscopy deferred.  [de-identified] : well appearing, in no distress [de-identified] : normocephalic, atraumatic [de-identified] : moves extremities without difficulty [de-identified] : warm and dry [de-identified] : alert and oriented x 3

## 2021-07-25 NOTE — DATA REVIEWED
[FreeTextEntry1] : MRI pelvis June 2021\par Complex posterior-anterior horseshoe fistula with significant interval improvement/healing. with mostly resolved anterior horseshoe fistula with tiny linear left sided residual and small residual posterior fistula tract extending from posterior midline interphincteric space to along left posterior external anal sphincter towards perianalorectal skin wound. Based on prior study, mostly scarred down anal entry point at the posterior wall midline 6 o'clock position of mid anus and another at the left posterior wall distal, about the level of anal verge. no abscess collection.\par \par R sided fistula: New or recurrent edema/enhancement of the right sided puborectalis sling over the right lateral to posterior anus appears extending from the above posterior midline intersphincteric origin and the rest of mostly scarred-down right subcutaneous fistula tract divides distally prior to reaching right medial skin wounds. No abscess collection. \par \par

## 2021-07-29 ENCOUNTER — LABORATORY RESULT (OUTPATIENT)
Age: 31
End: 2021-07-29

## 2021-07-29 ENCOUNTER — APPOINTMENT (OUTPATIENT)
Dept: HEPATOLOGY | Facility: CLINIC | Age: 31
End: 2021-07-29
Payer: COMMERCIAL

## 2021-07-29 ENCOUNTER — OUTPATIENT (OUTPATIENT)
Dept: OUTPATIENT SERVICES | Facility: HOSPITAL | Age: 31
LOS: 1 days | End: 2021-07-29
Payer: COMMERCIAL

## 2021-07-29 ENCOUNTER — APPOINTMENT (OUTPATIENT)
Dept: RADIOLOGY | Facility: CLINIC | Age: 31
End: 2021-07-29
Payer: COMMERCIAL

## 2021-07-29 VITALS
TEMPERATURE: 97.7 F | DIASTOLIC BLOOD PRESSURE: 87 MMHG | OXYGEN SATURATION: 98 % | HEIGHT: 67 IN | HEART RATE: 91 BPM | WEIGHT: 191 LBS | BODY MASS INDEX: 29.98 KG/M2 | SYSTOLIC BLOOD PRESSURE: 128 MMHG | RESPIRATION RATE: 14 BRPM

## 2021-07-29 DIAGNOSIS — E66.3 OVERWEIGHT: ICD-10-CM

## 2021-07-29 DIAGNOSIS — E66.9 OBESITY, UNSPECIFIED: ICD-10-CM

## 2021-07-29 DIAGNOSIS — R16.0 HEPATOMEGALY, NOT ELSEWHERE CLASSIFIED: ICD-10-CM

## 2021-07-29 DIAGNOSIS — K61.2 ANORECTAL ABSCESS: ICD-10-CM

## 2021-07-29 DIAGNOSIS — Z78.9 OTHER SPECIFIED HEALTH STATUS: ICD-10-CM

## 2021-07-29 DIAGNOSIS — K60.3 ANAL FISTULA: ICD-10-CM

## 2021-07-29 DIAGNOSIS — Z98.890 OTHER SPECIFIED POSTPROCEDURAL STATES: Chronic | ICD-10-CM

## 2021-07-29 DIAGNOSIS — K60.3 ANAL FISTULA: Chronic | ICD-10-CM

## 2021-07-29 PROCEDURE — 71046 X-RAY EXAM CHEST 2 VIEWS: CPT

## 2021-07-29 PROCEDURE — 99204 OFFICE O/P NEW MOD 45 MIN: CPT

## 2021-07-29 PROCEDURE — 71046 X-RAY EXAM CHEST 2 VIEWS: CPT | Mod: 26

## 2021-07-29 RX ORDER — OMEPRAZOLE 40 MG/1
40 CAPSULE, DELAYED RELEASE ORAL
Qty: 30 | Refills: 0 | Status: DISCONTINUED | COMMUNITY
Start: 2021-06-11 | End: 2021-07-29

## 2021-07-29 RX ORDER — PSYLLIUM HUSK 0.4 G
CAPSULE ORAL
Refills: 0 | Status: ACTIVE | COMMUNITY

## 2021-07-29 NOTE — HISTORY OF PRESENT ILLNESS
[Needlestick Exposure] : no needlestick exposure [Infected Sexual Partner] : no infected sexual partner [IV Drug Use] : no IV drug use [Tattoo] : no tattoos [Body Piercing] : no body piercing [Hemodialysis] : no hemodialysis [Transfusion before 1992] : no transfusion before 1992 [Transplant before 1992] : no transplant before 1992 [Incarceration] : no incarceration [Alcohol Abuse] : no alcohol abuse [Autoimmune Disorder] : no autoimmune disorder [Household Contact to HBV] : no household contact to HBV [Travel to Endemic Area] : no travel to an endemic area [Occupational Exposure] : no occupational exposure [Cocaine Use] : no cocaine use [de-identified] : Ms. TAMAYO is a 31 year old woman with recurrent perianal abscesses s/p surgery 2019 and 2/2021, current perianal fistula, who had transient epigastric pain and was found to have a 1 cm hypoechoic liver lesion on US, then ring-enhancing on an MRI. \par \par \par Alcohol history:\par Weight history: BMI ~30 longterm. 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 Normal liver otherwise. GB normal. DD: FNH, adenoma, myoblastic tumor, metastasis, infection. Recommendation 3 month f/u.\par - 6/18/21 MRI pelvis: thickened stripe of endometrium with heterogeneous T2 signal, not adequately evaluated on this study\par - 6/15/21 US abdomen: 1.0 cm hyperechoic nodule R lobe liver. DD: small hemangioma, but remains indeterminant. Consider MRI.\par - colonoscopy: never\par

## 2021-07-29 NOTE — ASSESSMENT
[FreeTextEntry1] : Ms. TAMAYO is a 31 year old woman with perianal abscesses s/p surgeries 2019 and 2/2019, current fistula, obesity, and liver lesion found when she had transient epigastric pain.\par \par \par - ring-enhancing liver lesion seen on MRI, 1 cm; hyperechoic on US. DD includes metastasis, benign lesion including abscess, but she has not had fevers or chills.\par \par - obesity, BMI 30, longstanding. \par - liver not fatty on MRI nd US\par - stripe of endometrial thickening seen on pelvic MRI, insufficiently seen. Never saw a gynecologist, but has referral.\par \par Plan:\par - will upload CD with MRI images and review with radiology\par - bloodwork as below\par - pt. encouraged to see gynecologist, has referral, for pelvic US to further evaluate the endometrial thickening \par - EGD, colonoscopy, CXR\par - return in 1 month with TRINH Mancuso

## 2021-07-29 NOTE — PHYSICAL EXAM
[General Appearance - Alert] : alert [General Appearance - In No Acute Distress] : in no acute distress [Sclera] : the sclera and conjunctiva were normal [PERRL With Normal Accommodation] : pupils were equal in size, round, and reactive to light [Extraocular Movements] : extraocular movements were intact [Outer Ear] : the ears and nose were normal in appearance [Oropharynx] : the oropharynx was normal [Neck Appearance] : the appearance of the neck was normal [Neck Cervical Mass (___cm)] : no neck mass was observed [Jugular Venous Distention Increased] : there was no jugular-venous distention [Thyroid Diffuse Enlargement] : the thyroid was not enlarged [Thyroid Nodule] : there were no palpable thyroid nodules [Auscultation Breath Sounds / Voice Sounds] : lungs were clear to auscultation bilaterally [Heart Rate And Rhythm] : heart rate was normal and rhythm regular [Heart Sounds] : normal S1 and S2 [Heart Sounds Gallop] : no gallops [Murmurs] : no murmurs [Heart Sounds Pericardial Friction Rub] : no pericardial rub [Full Pulse] : the pedal pulses are present [Edema] : there was no peripheral edema [Breast Appearance] : normal in appearance [Breast Palpation Mass] : no palpable masses [Bowel Sounds] : normal bowel sounds [Abdomen Soft] : soft [Abdomen Tenderness] : non-tender [Abdomen Mass (___ Cm)] : no abdominal mass palpated [Cervical Lymph Nodes Enlarged Posterior Bilaterally] : posterior cervical [Cervical Lymph Nodes Enlarged Anterior Bilaterally] : anterior cervical [Supraclavicular Lymph Nodes Enlarged Bilaterally] : supraclavicular [Axillary Lymph Nodes Enlarged Bilaterally] : axillary [Femoral Lymph Nodes Enlarged Bilaterally] : femoral [Inguinal Lymph Nodes Enlarged Bilaterally] : inguinal [Abnormal Walk] : normal gait [Nail Clubbing] : no clubbing  or cyanosis of the fingernails [Musculoskeletal - Swelling] : no joint swelling seen [Motor Tone] : muscle strength and tone were normal [Skin Color & Pigmentation] : normal skin color and pigmentation [Skin Turgor] : normal skin turgor [] : no rash [Deep Tendon Reflexes (DTR)] : deep tendon reflexes were 2+ and symmetric [Sensation] : the sensory exam was normal to light touch and pinprick [No Focal Deficits] : no focal deficits [Oriented To Time, Place, And Person] : oriented to person, place, and time [Impaired Insight] : insight and judgment were intact [Affect] : the affect was normal [Abdominal  Ascites] : no ascites [Splenomegaly] : no splenomegaly [Liver Palpable ___ Finger Breadths Below Costal Margin] : was not palpable below costal margin [Asterixis] : no asterixis observed [Jaundice] : No jaundice [Palmar Erythema] : no Palmar Erythema [Depression] : no depression [FreeTextEntry1] : obese

## 2021-08-04 ENCOUNTER — APPOINTMENT (OUTPATIENT)
Dept: HEPATOLOGY | Facility: CLINIC | Age: 31
End: 2021-08-04
Payer: COMMERCIAL

## 2021-08-04 PROCEDURE — 91200 LIVER ELASTOGRAPHY: CPT

## 2021-08-05 ENCOUNTER — NON-APPOINTMENT (OUTPATIENT)
Age: 31
End: 2021-08-05

## 2021-08-06 ENCOUNTER — NON-APPOINTMENT (OUTPATIENT)
Age: 31
End: 2021-08-06

## 2021-08-09 ENCOUNTER — TRANSCRIPTION ENCOUNTER (OUTPATIENT)
Age: 31
End: 2021-08-09

## 2021-08-09 DIAGNOSIS — Z12.11 ENCOUNTER FOR SCREENING FOR MALIGNANT NEOPLASM OF COLON: ICD-10-CM

## 2021-08-12 ENCOUNTER — TRANSCRIPTION ENCOUNTER (OUTPATIENT)
Age: 31
End: 2021-08-12

## 2021-09-02 ENCOUNTER — APPOINTMENT (OUTPATIENT)
Dept: HEPATOLOGY | Facility: CLINIC | Age: 31
End: 2021-09-02

## 2021-09-09 ENCOUNTER — OUTPATIENT (OUTPATIENT)
Dept: OUTPATIENT SERVICES | Facility: HOSPITAL | Age: 31
LOS: 1 days | End: 2021-09-09
Payer: COMMERCIAL

## 2021-09-09 ENCOUNTER — APPOINTMENT (OUTPATIENT)
Dept: MRI IMAGING | Facility: CLINIC | Age: 31
End: 2021-09-09
Payer: COMMERCIAL

## 2021-09-09 DIAGNOSIS — K60.3 ANAL FISTULA: ICD-10-CM

## 2021-09-09 DIAGNOSIS — Z98.890 OTHER SPECIFIED POSTPROCEDURAL STATES: Chronic | ICD-10-CM

## 2021-09-09 DIAGNOSIS — Z00.8 ENCOUNTER FOR OTHER GENERAL EXAMINATION: ICD-10-CM

## 2021-09-09 DIAGNOSIS — K60.3 ANAL FISTULA: Chronic | ICD-10-CM

## 2021-09-09 PROCEDURE — 74183 MRI ABD W/O CNTR FLWD CNTR: CPT | Mod: 26

## 2021-09-09 PROCEDURE — 74183 MRI ABD W/O CNTR FLWD CNTR: CPT

## 2021-09-09 PROCEDURE — A9581: CPT

## 2021-09-17 ENCOUNTER — NON-APPOINTMENT (OUTPATIENT)
Age: 31
End: 2021-09-17

## 2021-09-17 ENCOUNTER — TRANSCRIPTION ENCOUNTER (OUTPATIENT)
Age: 31
End: 2021-09-17

## 2021-09-17 DIAGNOSIS — K76.89 OTHER SPECIFIED DISEASES OF LIVER: ICD-10-CM

## 2021-09-17 DIAGNOSIS — R16.0 HEPATOMEGALY, NOT ELSEWHERE CLASSIFIED: ICD-10-CM

## 2021-09-20 ENCOUNTER — TRANSCRIPTION ENCOUNTER (OUTPATIENT)
Age: 31
End: 2021-09-20

## 2021-09-21 ENCOUNTER — NON-APPOINTMENT (OUTPATIENT)
Age: 31
End: 2021-09-21

## 2021-09-23 ENCOUNTER — TRANSCRIPTION ENCOUNTER (OUTPATIENT)
Age: 31
End: 2021-09-23

## 2021-10-13 ENCOUNTER — TRANSCRIPTION ENCOUNTER (OUTPATIENT)
Age: 31
End: 2021-10-13

## 2021-10-27 ENCOUNTER — APPOINTMENT (OUTPATIENT)
Dept: HEPATOLOGY | Facility: HOSPITAL | Age: 31
End: 2021-10-27

## 2021-12-08 ENCOUNTER — TRANSCRIPTION ENCOUNTER (OUTPATIENT)
Age: 31
End: 2021-12-08

## 2021-12-09 ENCOUNTER — TRANSCRIPTION ENCOUNTER (OUTPATIENT)
Age: 31
End: 2021-12-09

## 2021-12-16 ENCOUNTER — APPOINTMENT (OUTPATIENT)
Dept: ULTRASOUND IMAGING | Facility: CLINIC | Age: 31
End: 2021-12-16
Payer: COMMERCIAL

## 2021-12-16 ENCOUNTER — OUTPATIENT (OUTPATIENT)
Dept: OUTPATIENT SERVICES | Facility: HOSPITAL | Age: 31
LOS: 1 days | End: 2021-12-16
Payer: COMMERCIAL

## 2021-12-16 DIAGNOSIS — Z98.890 OTHER SPECIFIED POSTPROCEDURAL STATES: Chronic | ICD-10-CM

## 2021-12-16 DIAGNOSIS — K60.3 ANAL FISTULA: Chronic | ICD-10-CM

## 2021-12-16 DIAGNOSIS — K76.89 OTHER SPECIFIED DISEASES OF LIVER: ICD-10-CM

## 2021-12-16 PROCEDURE — 76700 US EXAM ABDOM COMPLETE: CPT

## 2021-12-16 PROCEDURE — 76700 US EXAM ABDOM COMPLETE: CPT | Mod: 26

## 2021-12-17 ENCOUNTER — NON-APPOINTMENT (OUTPATIENT)
Age: 31
End: 2021-12-17

## 2022-01-03 ENCOUNTER — APPOINTMENT (OUTPATIENT)
Dept: HEPATOLOGY | Facility: CLINIC | Age: 32
End: 2022-01-03

## 2022-02-24 ENCOUNTER — TRANSCRIPTION ENCOUNTER (OUTPATIENT)
Age: 32
End: 2022-02-24

## 2022-06-24 ENCOUNTER — NON-APPOINTMENT (OUTPATIENT)
Age: 32
End: 2022-06-24

## 2022-08-03 ENCOUNTER — APPOINTMENT (OUTPATIENT)
Dept: FAMILY MEDICINE | Facility: CLINIC | Age: 32
End: 2022-08-03

## 2022-08-03 DIAGNOSIS — R53.83 OTHER FATIGUE: ICD-10-CM

## 2022-08-03 DIAGNOSIS — U07.1 COVID-19: ICD-10-CM

## 2022-08-03 DIAGNOSIS — J02.9 ACUTE PHARYNGITIS, UNSPECIFIED: ICD-10-CM

## 2022-08-03 DIAGNOSIS — Z13.1 ENCOUNTER FOR SCREENING FOR DIABETES MELLITUS: ICD-10-CM

## 2022-08-03 PROCEDURE — 99213 OFFICE O/P EST LOW 20 MIN: CPT | Mod: 95

## 2022-08-03 NOTE — PLAN
[FreeTextEntry1] : Rest, fluids. OTC symptom relief. \par Reassured patient time to recovery can vary.\par

## 2022-08-03 NOTE — HISTORY OF PRESENT ILLNESS
[Home] : at home, [unfilled] , at the time of the visit. [Medical Office: ([facility])___] : at the medical office located in  [Verbal consent obtained from patient] : the patient, [unfilled] [FreeTextEntry8] : Tested positive for COVID 12 days ago. \par Concerned as still having symptoms of sore throat and fatigue. Headaches persist. \par Taking DayQuil and NyQuil for symptom relief.\par Back to work and tired.

## 2023-04-03 ENCOUNTER — APPOINTMENT (OUTPATIENT)
Dept: FAMILY MEDICINE | Facility: CLINIC | Age: 33
End: 2023-04-03
Payer: COMMERCIAL

## 2023-04-03 VITALS
WEIGHT: 182 LBS | TEMPERATURE: 97.7 F | HEART RATE: 48 BPM | DIASTOLIC BLOOD PRESSURE: 76 MMHG | OXYGEN SATURATION: 98 % | HEIGHT: 67 IN | SYSTOLIC BLOOD PRESSURE: 118 MMHG | BODY MASS INDEX: 28.56 KG/M2

## 2023-04-03 DIAGNOSIS — N91.2 AMENORRHEA, UNSPECIFIED: ICD-10-CM

## 2023-04-03 PROCEDURE — 99213 OFFICE O/P EST LOW 20 MIN: CPT

## 2023-04-03 NOTE — PLAN
[FreeTextEntry1] : Amenorrhea\par - GYN referral\par \par HLD\par - discussed diet and exercise\par - labs reviewed

## 2023-04-03 NOTE — HISTORY OF PRESENT ILLNESS
[FreeTextEntry1] : f/u  [de-identified] : 32 year old female presents for f/u for HLD. She feels well overall. Has not seen a doctor recently but did have labs done at work which she brought for review. Now cholesterol a little high and LDL 130s. \par She also states she has not had her period for 2 months . Not sexually active

## 2023-04-18 NOTE — PRE-OP CHECKLIST - PATIENT SENT TO
holding area Eucrisa Counseling: Patient may experience a mild burning sensation during topical application. Eucrisa is not approved in children less than 3 months of age.

## 2023-06-08 ENCOUNTER — APPOINTMENT (OUTPATIENT)
Dept: FAMILY MEDICINE | Facility: CLINIC | Age: 33
End: 2023-06-08
Payer: COMMERCIAL

## 2023-06-08 VITALS
HEIGHT: 67 IN | DIASTOLIC BLOOD PRESSURE: 80 MMHG | BODY MASS INDEX: 28.56 KG/M2 | WEIGHT: 182 LBS | SYSTOLIC BLOOD PRESSURE: 122 MMHG | TEMPERATURE: 97.7 F | OXYGEN SATURATION: 97 % | HEART RATE: 91 BPM

## 2023-06-08 DIAGNOSIS — J02.9 ACUTE PHARYNGITIS, UNSPECIFIED: ICD-10-CM

## 2023-06-08 PROCEDURE — 99213 OFFICE O/P EST LOW 20 MIN: CPT

## 2023-06-08 NOTE — HISTORY OF PRESENT ILLNESS
[FreeTextEntry8] : PT presenting for sore throat\par sore throat, painful to swallow\par muscle pain and aches\par no fevers no GI symptoms\par

## 2023-06-08 NOTE — REVIEW OF SYSTEMS
[Earache] : earache [Nasal Discharge] : nasal discharge [Sore Throat] : sore throat [Postnasal Drip] : no postnasal drip [Cough] : cough [Negative] : Constitutional

## 2023-06-08 NOTE — PHYSICAL EXAM
[Normal] : no acute distress, well nourished, well developed and well-appearing [de-identified] : red, inflammed oropahrynx

## 2023-06-08 NOTE — ASSESSMENT
[FreeTextEntry1] : most likely strep- sent abx x 10 days \par pt would like covid swab, pending., \par \par \par \par discussed viral vs bacterial infections and discussed OTC treatment options including rest, fluids, hydration, motrin/tylenol PRN \par Use stream inhalation and neti pot PRN\par \par if no improvement in 1 week f/u \par antibiotics sent per above, counseled on antibiotic resistance \par \par

## 2023-06-09 ENCOUNTER — TRANSCRIPTION ENCOUNTER (OUTPATIENT)
Age: 33
End: 2023-06-09

## 2023-06-09 ENCOUNTER — APPOINTMENT (OUTPATIENT)
Dept: FAMILY MEDICINE | Facility: CLINIC | Age: 33
End: 2023-06-09

## 2023-06-09 LAB
INFLUENZA A RESULT: NOT DETECTED
INFLUENZA B RESULT: NOT DETECTED
RESP SYN VIRUS RESULT: NOT DETECTED
SARS-COV-2 RESULT: DETECTED

## 2023-06-26 ENCOUNTER — APPOINTMENT (OUTPATIENT)
Dept: FAMILY MEDICINE | Facility: CLINIC | Age: 33
End: 2023-06-26
Payer: COMMERCIAL

## 2023-06-26 VITALS
DIASTOLIC BLOOD PRESSURE: 76 MMHG | HEIGHT: 67 IN | BODY MASS INDEX: 28.41 KG/M2 | OXYGEN SATURATION: 96 % | TEMPERATURE: 97 F | WEIGHT: 181 LBS | HEART RATE: 121 BPM | SYSTOLIC BLOOD PRESSURE: 122 MMHG

## 2023-06-26 DIAGNOSIS — J02.9 ACUTE PHARYNGITIS, UNSPECIFIED: ICD-10-CM

## 2023-06-26 LAB — S PYO AG SPEC QL IA: NEGATIVE

## 2023-06-26 PROCEDURE — 87880 STREP A ASSAY W/OPTIC: CPT | Mod: QW

## 2023-06-26 PROCEDURE — 99213 OFFICE O/P EST LOW 20 MIN: CPT | Mod: 25

## 2023-06-26 NOTE — PHYSICAL EXAM
[No Acute Distress] : no acute distress [Well-Appearing] : well-appearing [Normal Sclera/Conjunctiva] : normal sclera/conjunctiva [PERRL] : pupils equal round and reactive to light [Normal Outer Ear/Nose] : the outer ears and nose were normal in appearance [Normal TMs] : both tympanic membranes were normal [No Lymphadenopathy] : no lymphadenopathy [Supple] : supple [No Respiratory Distress] : no respiratory distress  [No Accessory Muscle Use] : no accessory muscle use [Clear to Auscultation] : lungs were clear to auscultation bilaterally [Normal Rate] : normal rate  [Regular Rhythm] : with a regular rhythm [de-identified] : + tonsillar exudate

## 2023-06-26 NOTE — PLAN
[FreeTextEntry1] : Exudative pharyngitis\par - likely viral\par - rapid strept neg\par - f/u throat cx\par - tea with honey and salt water gargles

## 2023-06-26 NOTE — HISTORY OF PRESENT ILLNESS
[FreeTextEntry8] : 32 year old female presents with concerns of throat infection. States she had sore throat and was treated with 10 days of amox. Was dx with covid at that time. She felt better and then a few days later sore throat returned. It has now subsided but she still feels some tenderness in her throat . Minimal cough. No fever or chills

## 2023-06-29 LAB — BACTERIA THROAT CULT: NORMAL

## 2023-07-13 ENCOUNTER — TRANSCRIPTION ENCOUNTER (OUTPATIENT)
Age: 33
End: 2023-07-13

## 2023-08-01 ENCOUNTER — APPOINTMENT (OUTPATIENT)
Dept: FAMILY MEDICINE | Facility: CLINIC | Age: 33
End: 2023-08-01
Payer: COMMERCIAL

## 2023-08-01 VITALS
HEART RATE: 77 BPM | HEIGHT: 67 IN | BODY MASS INDEX: 28.76 KG/M2 | TEMPERATURE: 97.2 F | WEIGHT: 183.25 LBS | OXYGEN SATURATION: 97 % | SYSTOLIC BLOOD PRESSURE: 102 MMHG | DIASTOLIC BLOOD PRESSURE: 78 MMHG

## 2023-08-01 DIAGNOSIS — E78.00 PURE HYPERCHOLESTEROLEMIA, UNSPECIFIED: ICD-10-CM

## 2023-08-01 DIAGNOSIS — Z11.3 ENCOUNTER FOR SCREENING FOR INFECTIONS WITH A PREDOMINANTLY SEXUAL MODE OF TRANSMISSION: ICD-10-CM

## 2023-08-01 DIAGNOSIS — Z13.220 ENCOUNTER FOR SCREENING FOR LIPOID DISORDERS: ICD-10-CM

## 2023-08-01 DIAGNOSIS — Z13.21 ENCOUNTER FOR SCREENING FOR NUTRITIONAL DISORDER: ICD-10-CM

## 2023-08-01 DIAGNOSIS — Z00.00 ENCOUNTER FOR GENERAL ADULT MEDICAL EXAMINATION W/OUT ABNORMAL FINDINGS: ICD-10-CM

## 2023-08-01 PROCEDURE — 99395 PREV VISIT EST AGE 18-39: CPT

## 2023-08-01 RX ORDER — PEG-3350, SODIUM SULFATE, SODIUM CHLORIDE, POTASSIUM CHLORIDE, SODIUM ASCORBATE AND ASCORBIC ACID 7.5-2.691G
100 KIT ORAL
Qty: 2 | Refills: 0 | Status: COMPLETED | COMMUNITY
Start: 2021-08-09 | End: 2023-08-01

## 2023-08-01 RX ORDER — POLYETHYLENE GLYCOL 3350 AND ELECTROLYTES WITH LEMON FLAVOR 236; 22.74; 6.74; 5.86; 2.97 G/4L; G/4L; G/4L; G/4L; G/4L
236 POWDER, FOR SOLUTION ORAL
Qty: 1 | Refills: 0 | Status: COMPLETED | COMMUNITY
Start: 2021-08-09 | End: 2023-08-01

## 2023-08-01 RX ORDER — AMOXICILLIN 500 MG/1
500 CAPSULE ORAL
Qty: 20 | Refills: 0 | Status: COMPLETED | COMMUNITY
Start: 2023-06-08 | End: 2023-08-01

## 2023-08-01 NOTE — PHYSICAL EXAM
[No Acute Distress] : no acute distress [Well-Appearing] : well-appearing [Normal Sclera/Conjunctiva] : normal sclera/conjunctiva [PERRL] : pupils equal round and reactive to light [Normal Outer Ear/Nose] : the outer ears and nose were normal in appearance [Normal Oropharynx] : the oropharynx was normal [Normal TMs] : both tympanic membranes were normal [No Lymphadenopathy] : no lymphadenopathy [Supple] : supple [No Respiratory Distress] : no respiratory distress  [No Accessory Muscle Use] : no accessory muscle use [Clear to Auscultation] : lungs were clear to auscultation bilaterally [Normal Rate] : normal rate  [Regular Rhythm] : with a regular rhythm [Soft] : abdomen soft [Non Tender] : non-tender [Non-distended] : non-distended [Normal Bowel Sounds] : normal bowel sounds [No Rash] : no rash [No Focal Deficits] : no focal deficits [Normal Affect] : the affect was normal [Normal Insight/Judgement] : insight and judgment were intact

## 2023-08-01 NOTE — HISTORY OF PRESENT ILLNESS
[FreeTextEntry1] : CPE [de-identified] : 33 year old female presents for CPE. She feels well with no concerns today Is trying to eat healthy and lose weight. Is exercising 3x a week

## 2023-08-01 NOTE — HEALTH RISK ASSESSMENT
[Never] : Never [Yes] : Yes [de-identified] : social [de-identified] : 3x a week [de-identified] : healthy

## 2023-08-03 LAB
25(OH)D3 SERPL-MCNC: 30.6 NG/ML
ALBUMIN SERPL ELPH-MCNC: 4.8 G/DL
ALP BLD-CCNC: 67 U/L
ALT SERPL-CCNC: 14 U/L
ANION GAP SERPL CALC-SCNC: 14 MMOL/L
AST SERPL-CCNC: 14 U/L
BILIRUB SERPL-MCNC: 0.5 MG/DL
BUN SERPL-MCNC: 10 MG/DL
C TRACH RRNA SPEC QL NAA+PROBE: NOT DETECTED
CALCIUM SERPL-MCNC: 9.4 MG/DL
CHLORIDE SERPL-SCNC: 101 MMOL/L
CHOLEST SERPL-MCNC: 191 MG/DL
CO2 SERPL-SCNC: 23 MMOL/L
CREAT SERPL-MCNC: 0.7 MG/DL
EGFR: 117 ML/MIN/1.73M2
ESTIMATED AVERAGE GLUCOSE: 108 MG/DL
FOLATE SERPL-MCNC: 15.4 NG/ML
GLUCOSE SERPL-MCNC: 86 MG/DL
HAV IGM SER QL: NONREACTIVE
HBA1C MFR BLD HPLC: 5.4 %
HBV CORE IGM SER QL: NONREACTIVE
HBV SURFACE AG SER QL: NONREACTIVE
HCV AB SER QL: NONREACTIVE
HCV S/CO RATIO: 0.1 S/CO
HDLC SERPL-MCNC: 65 MG/DL
HIV1+2 AB SPEC QL IA.RAPID: NONREACTIVE
HSV 1+2 IGG SER IA-IMP: NEGATIVE
HSV 1+2 IGG SER IA-IMP: NEGATIVE
HSV1 IGG SER QL: 0.5 INDEX
HSV2 IGG SER QL: 0.09 INDEX
LDLC SERPL CALC-MCNC: 110 MG/DL
N GONORRHOEA RRNA SPEC QL NAA+PROBE: NOT DETECTED
NONHDLC SERPL-MCNC: 126 MG/DL
POTASSIUM SERPL-SCNC: 4.7 MMOL/L
PROT SERPL-MCNC: 7.6 G/DL
SODIUM SERPL-SCNC: 138 MMOL/L
SOURCE AMPLIFICATION: NORMAL
T PALLIDUM AB SER QL IA: NEGATIVE
TRIGL SERPL-MCNC: 89 MG/DL
TSH SERPL-ACNC: 1.02 UIU/ML
VIT B12 SERPL-MCNC: 654 PG/ML

## 2024-04-04 ENCOUNTER — APPOINTMENT (OUTPATIENT)
Dept: FAMILY MEDICINE | Facility: CLINIC | Age: 34
End: 2024-04-04

## 2024-06-12 NOTE — ED STATDOCS - NS_ATTENDINGSCRIBE_ED_ALL_ED
Calm I personally performed the service described in the documentation recorded by the scribe in my presence, and it accurately and completely records my words and actions.

## 2024-09-05 ENCOUNTER — APPOINTMENT (OUTPATIENT)
Dept: FAMILY MEDICINE | Facility: CLINIC | Age: 34
End: 2024-09-05
Payer: COMMERCIAL

## 2024-09-05 VITALS
RESPIRATION RATE: 14 BRPM | OXYGEN SATURATION: 97 % | HEIGHT: 67 IN | TEMPERATURE: 97.6 F | DIASTOLIC BLOOD PRESSURE: 70 MMHG | BODY MASS INDEX: 28.72 KG/M2 | HEART RATE: 78 BPM | WEIGHT: 183 LBS | SYSTOLIC BLOOD PRESSURE: 100 MMHG

## 2024-09-05 DIAGNOSIS — J98.8 OTHER SPECIFIED RESPIRATORY DISORDERS: ICD-10-CM

## 2024-09-05 DIAGNOSIS — B97.89 OTHER SPECIFIED RESPIRATORY DISORDERS: ICD-10-CM

## 2024-09-05 PROCEDURE — 99213 OFFICE O/P EST LOW 20 MIN: CPT

## 2024-09-05 RX ORDER — FLUTICASONE PROPIONATE 50 UG/1
50 SPRAY, METERED NASAL
Qty: 1 | Refills: 0 | Status: ACTIVE | COMMUNITY
Start: 2024-09-05 | End: 1900-01-01

## 2024-09-05 NOTE — ASSESSMENT
[FreeTextEntry1] : Upper Respiratory Infection (presumed viral origin) Based on the reported symptoms and physical examination, the preliminary diagnosis is an upper respiratory infection, likely of viral origin - Therapeutic Interventions: Continue taking DayQuil and NyQuil for symptom management. fluticasone will be prescribed to help with the nasal congestion and assist the ear's drainage.  - Diagnostic Tests: COVID/flu pcr ordered

## 2024-09-05 NOTE — HISTORY OF PRESENT ILLNESS
[FreeTextEntry8] : Pt reported having a cold for the past four days. The primary symptoms include an itchy throat and nasal congestion. Additionally, their R ear feels clogged, a symptom they noticed after a recent flight. The patient took a home COVID-19 test 9/3, which returned negative results. They have been self-administering DayQuil and NyQuil to manage their symptoms and started taking an unspecified antihistamine for the itchy throat. Denies fever, chills.

## 2024-09-05 NOTE — PHYSICAL EXAM
[Normal Outer Ear/Nose] : the outer ears and nose were normal in appearance [Normal] : affect was normal and insight and judgment were intact [de-identified] : R TM retracted, no exudates in throat

## 2024-09-06 LAB
INFLUENZA A RESULT: NOT DETECTED
INFLUENZA B RESULT: NOT DETECTED
RESP SYN VIRUS RESULT: NOT DETECTED
SARS-COV-2 RESULT: NOT DETECTED

## 2024-09-16 ENCOUNTER — APPOINTMENT (OUTPATIENT)
Dept: FAMILY MEDICINE | Facility: CLINIC | Age: 34
End: 2024-09-16
Payer: COMMERCIAL

## 2024-09-16 VITALS
OXYGEN SATURATION: 99 % | HEIGHT: 67 IN | SYSTOLIC BLOOD PRESSURE: 120 MMHG | TEMPERATURE: 97.8 F | HEART RATE: 80 BPM | WEIGHT: 184 LBS | DIASTOLIC BLOOD PRESSURE: 74 MMHG | BODY MASS INDEX: 28.88 KG/M2

## 2024-09-16 DIAGNOSIS — Z13.21 ENCOUNTER FOR SCREENING FOR NUTRITIONAL DISORDER: ICD-10-CM

## 2024-09-16 DIAGNOSIS — Z12.4 ENCOUNTER FOR SCREENING FOR MALIGNANT NEOPLASM OF CERVIX: ICD-10-CM

## 2024-09-16 DIAGNOSIS — H65.90 UNSPECIFIED NONSUPPURATIVE OTITIS MEDIA, UNSPECIFIED EAR: ICD-10-CM

## 2024-09-16 DIAGNOSIS — Z00.00 ENCOUNTER FOR GENERAL ADULT MEDICAL EXAMINATION W/OUT ABNORMAL FINDINGS: ICD-10-CM

## 2024-09-16 DIAGNOSIS — Z13.220 ENCOUNTER FOR SCREENING FOR LIPOID DISORDERS: ICD-10-CM

## 2024-09-16 DIAGNOSIS — Z11.3 ENCOUNTER FOR SCREENING FOR INFECTIONS WITH A PREDOMINANTLY SEXUAL MODE OF TRANSMISSION: ICD-10-CM

## 2024-09-16 DIAGNOSIS — E78.00 PURE HYPERCHOLESTEROLEMIA, UNSPECIFIED: ICD-10-CM

## 2024-09-16 PROCEDURE — 99395 PREV VISIT EST AGE 18-39: CPT

## 2024-09-16 RX ORDER — CETIRIZINE HYDROCHLORIDE AND PSEUDOEPHEDRINE HYDROCHLORIDE 5; 120 MG/1; MG/1
5-120 TABLET, FILM COATED, EXTENDED RELEASE ORAL TWICE DAILY
Qty: 1 | Refills: 0 | Status: ACTIVE | COMMUNITY
Start: 2024-09-16 | End: 1900-01-01

## 2024-09-16 NOTE — PLAN
[FreeTextEntry1] : Fluid in ear - trial of zyrtec-D  HCM  -f/u labs - healthy diet and exercise  - has never seen GYN, provided with referral

## 2024-09-16 NOTE — HISTORY OF PRESENT ILLNESS
[FreeTextEntry1] : CPE [de-identified] : 34 year old female presents for CPE. She is doing well overall.  She was recently ill and feels mostly better except for R ear feeling clogged. She complains of difficulty losing weight and slow metabolism. Tries to eat clean and healthy. Does not really exercise, has difficulty finding the time

## 2024-09-16 NOTE — HEALTH RISK ASSESSMENT
[Yes] : Yes [Never] : Never [de-identified] : rare  [de-identified] : typically exercises  [de-identified] : eating healthy  Yes

## 2024-09-17 LAB
25(OH)D3 SERPL-MCNC: 33.2 NG/ML
ALBUMIN SERPL ELPH-MCNC: 5 G/DL
ALP BLD-CCNC: 70 U/L
ALT SERPL-CCNC: 15 U/L
ANION GAP SERPL CALC-SCNC: 17 MMOL/L
AST SERPL-CCNC: 15 U/L
BASOPHILS # BLD AUTO: 0.05 K/UL
BASOPHILS NFR BLD AUTO: 0.5 %
BILIRUB SERPL-MCNC: 0.2 MG/DL
BUN SERPL-MCNC: 9 MG/DL
C TRACH RRNA SPEC QL NAA+PROBE: NOT DETECTED
CALCIUM SERPL-MCNC: 9.2 MG/DL
CHLORIDE SERPL-SCNC: 101 MMOL/L
CHOLEST SERPL-MCNC: 236 MG/DL
CO2 SERPL-SCNC: 22 MMOL/L
CREAT SERPL-MCNC: 0.61 MG/DL
EGFR: 120 ML/MIN/1.73M2
EOSINOPHIL # BLD AUTO: 0.06 K/UL
EOSINOPHIL NFR BLD AUTO: 0.6 %
ESTIMATED AVERAGE GLUCOSE: 108 MG/DL
FOLATE SERPL-MCNC: 10.4 NG/ML
GLUCOSE SERPL-MCNC: 78 MG/DL
HBA1C MFR BLD HPLC: 5.4 %
HCT VFR BLD CALC: 38.2 %
HDLC SERPL-MCNC: 62 MG/DL
HGB BLD-MCNC: 12.4 G/DL
HIV1+2 AB SPEC QL IA.RAPID: NONREACTIVE
IMM GRANULOCYTES NFR BLD AUTO: 0.2 %
LDLC SERPL CALC-MCNC: 157 MG/DL
LYMPHOCYTES # BLD AUTO: 2.04 K/UL
LYMPHOCYTES NFR BLD AUTO: 20.9 %
MAN DIFF?: NORMAL
MCHC RBC-ENTMCNC: 28.1 PG
MCHC RBC-ENTMCNC: 32.5 GM/DL
MCV RBC AUTO: 86.4 FL
MONOCYTES # BLD AUTO: 0.43 K/UL
MONOCYTES NFR BLD AUTO: 4.4 %
N GONORRHOEA RRNA SPEC QL NAA+PROBE: NOT DETECTED
NEUTROPHILS # BLD AUTO: 7.18 K/UL
NEUTROPHILS NFR BLD AUTO: 73.4 %
NONHDLC SERPL-MCNC: 174 MG/DL
PLATELET # BLD AUTO: 338 K/UL
POTASSIUM SERPL-SCNC: 4.6 MMOL/L
PROT SERPL-MCNC: 8 G/DL
RBC # BLD: 4.42 M/UL
RBC # FLD: 13.2 %
SODIUM SERPL-SCNC: 140 MMOL/L
SOURCE AMPLIFICATION: NORMAL
TRIGL SERPL-MCNC: 101 MG/DL
TSH SERPL-ACNC: 0.69 UIU/ML
VIT B12 SERPL-MCNC: 687 PG/ML
WBC # FLD AUTO: 9.78 K/UL

## 2024-09-19 LAB
HAV IGM SER QL: NONREACTIVE
HBV CORE IGM SER QL: NONREACTIVE
HBV SURFACE AG SER QL: NONREACTIVE
HCV AB SER QL: NONREACTIVE
HCV S/CO RATIO: 0.12 S/CO
HSV 1+2 IGG SER IA-IMP: NEGATIVE
HSV 1+2 IGG SER IA-IMP: NEGATIVE
HSV1 IGG SER QL: 0.46 INDEX
HSV2 IGG SER QL: 0.05 INDEX
T PALLIDUM AB SER QL IA: NEGATIVE

## 2024-10-04 ENCOUNTER — TRANSCRIPTION ENCOUNTER (OUTPATIENT)
Age: 34
End: 2024-10-04

## 2024-10-25 ENCOUNTER — NON-APPOINTMENT (OUTPATIENT)
Age: 34
End: 2024-10-25

## 2024-10-25 ENCOUNTER — APPOINTMENT (OUTPATIENT)
Dept: OBGYN | Facility: CLINIC | Age: 34
End: 2024-10-25
Payer: COMMERCIAL

## 2024-10-25 VITALS
SYSTOLIC BLOOD PRESSURE: 118 MMHG | DIASTOLIC BLOOD PRESSURE: 76 MMHG | BODY MASS INDEX: 28.56 KG/M2 | HEIGHT: 67 IN | WEIGHT: 182 LBS

## 2024-10-25 DIAGNOSIS — Z11.3 ENCOUNTER FOR SCREENING FOR INFECTIONS WITH A PREDOMINANTLY SEXUAL MODE OF TRANSMISSION: ICD-10-CM

## 2024-10-25 DIAGNOSIS — Z13.21 ENCOUNTER FOR SCREENING FOR NUTRITIONAL DISORDER: ICD-10-CM

## 2024-10-25 DIAGNOSIS — Z12.11 ENCOUNTER FOR SCREENING FOR MALIGNANT NEOPLASM OF COLON: ICD-10-CM

## 2024-10-25 DIAGNOSIS — U07.1 COVID-19: ICD-10-CM

## 2024-10-25 DIAGNOSIS — Z01.419 ENCOUNTER FOR GYNECOLOGICAL EXAMINATION (GENERAL) (ROUTINE) W/OUT ABNORMAL FINDINGS: ICD-10-CM

## 2024-10-25 DIAGNOSIS — Z87.42 PERSONAL HISTORY OF OTHER DISEASES OF THE FEMALE GENITAL TRACT: ICD-10-CM

## 2024-10-25 DIAGNOSIS — J98.8 OTHER SPECIFIED RESPIRATORY DISORDERS: ICD-10-CM

## 2024-10-25 DIAGNOSIS — L68.0 HIRSUTISM: ICD-10-CM

## 2024-10-25 DIAGNOSIS — K61.2 ANORECTAL ABSCESS: ICD-10-CM

## 2024-10-25 DIAGNOSIS — Z13.31 ENCOUNTER FOR SCREENING FOR DEPRESSION: ICD-10-CM

## 2024-10-25 DIAGNOSIS — Z13.220 ENCOUNTER FOR SCREENING FOR LIPOID DISORDERS: ICD-10-CM

## 2024-10-25 DIAGNOSIS — Z12.4 ENCOUNTER FOR SCREENING FOR MALIGNANT NEOPLASM OF CERVIX: ICD-10-CM

## 2024-10-25 DIAGNOSIS — H65.90 UNSPECIFIED NONSUPPURATIVE OTITIS MEDIA, UNSPECIFIED EAR: ICD-10-CM

## 2024-10-25 DIAGNOSIS — B97.89 OTHER SPECIFIED RESPIRATORY DISORDERS: ICD-10-CM

## 2024-10-25 PROCEDURE — 99385 PREV VISIT NEW AGE 18-39: CPT

## 2024-10-25 PROCEDURE — 99459 PELVIC EXAMINATION: CPT

## 2024-10-31 LAB
CYTOLOGY CVX/VAG DOC THIN PREP: NORMAL
HPV HIGH+LOW RISK DNA PNL CVX: NOT DETECTED

## 2024-11-07 ENCOUNTER — APPOINTMENT (OUTPATIENT)
Dept: OTOLARYNGOLOGY | Facility: CLINIC | Age: 34
End: 2024-11-07